# Patient Record
Sex: FEMALE | Race: WHITE | NOT HISPANIC OR LATINO | Employment: OTHER | URBAN - METROPOLITAN AREA
[De-identification: names, ages, dates, MRNs, and addresses within clinical notes are randomized per-mention and may not be internally consistent; named-entity substitution may affect disease eponyms.]

---

## 2020-01-21 ENCOUNTER — APPOINTMENT (OUTPATIENT)
Dept: GENERAL RADIOLOGY | Facility: HOSPITAL | Age: 75
End: 2020-01-21

## 2020-01-21 ENCOUNTER — HOSPITAL ENCOUNTER (EMERGENCY)
Facility: HOSPITAL | Age: 75
Discharge: HOME | End: 2020-01-21
Attending: EMERGENCY MEDICINE | Admitting: EMERGENCY MEDICINE

## 2020-01-21 VITALS
WEIGHT: 121 LBS | OXYGEN SATURATION: 99 % | SYSTOLIC BLOOD PRESSURE: 127 MMHG | RESPIRATION RATE: 16 BRPM | BODY MASS INDEX: 26.1 KG/M2 | HEIGHT: 57 IN | DIASTOLIC BLOOD PRESSURE: 64 MMHG | HEART RATE: 72 BPM | TEMPERATURE: 97.5 F

## 2020-01-21 DIAGNOSIS — S30.0XXA CONTUSION OF LOWER BACK, INITIAL ENCOUNTER: Primary | ICD-10-CM
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
## 2020-01-21 PROCEDURE — 99283 EMERGENCY DEPT VISIT LOW MDM: CPT

## 2020-01-21 PROCEDURE — 72170 X-RAY EXAM OF PELVIS: CPT

## 2020-01-21 RX ORDER — TRAMADOL HYDROCHLORIDE 50 MG/1
50 TABLET ORAL EVERY 8 HOURS PRN
Qty: 12 TABLET | Refills: 0 | Status: ON HOLD | OUTPATIENT
Start: 2020-01-21 | End: 2023-03-26

## 2020-01-21 NOTE — ED PROVIDER NOTES
Subjective   Patient is a 74-year-old female who slipped and fell.  She complains of pain to her right buttocks.  She has no other complaint of injury.  The pain is moderate and worse on motion.  This happened several days ago.          Review of Systems  For head pain neck pain chest pain shortness of breath abdominal pain extremity pain or other complaint.  No past medical history on file.    No Known Allergies    No past surgical history on file.    No family history on file.    Social History     Socioeconomic History   • Marital status:      Spouse name: Not on file   • Number of children: Not on file   • Years of education: Not on file   • Highest education level: Not on file           Objective   Physical Exam  HEENT exam is no point test.  Neck is nontender.  Neurologic exam is nonfocal.  Lungs are clear.  Heart has a regular rhythm.  Chest is nontender.  Abdomen is soft nontender.  Extremity exam shows pain to palpation over her right buttocks.  Procedures           ED Course           Xr Pelvis 1 Or 2 View    Result Date: 1/21/2020  Mild degenerative change of both hips.  No acute bony abnormality identified.  Electronically Signed By-Wade Zavala On:1/21/2020 1:31 PM This report was finalized on [id number] by  Wade Zavala, .                                        MDM  Number of Diagnoses or Management Options  Diagnosis management comments: Patient has findings consistent with right buttocks contusion after falling.  She will be discharged with a prescription for Ultram.  Use a heating pad and follow with MD for recheck but there is no evidence of other injury.    Risk of Complications, Morbidity, and/or Mortality  Presenting problems: moderate  Diagnostic procedures: moderate  Management options: moderate    Patient Progress  Patient progress: stable      Final diagnoses:   Contusion of lower back, initial encounter   Fall, initial encounter            Petey Dos Santos MD  01/21/20 8179

## 2023-03-25 ENCOUNTER — APPOINTMENT (OUTPATIENT)
Dept: GENERAL RADIOLOGY | Facility: HOSPITAL | Age: 78
DRG: 247 | End: 2023-03-25
Payer: MEDICARE

## 2023-03-25 ENCOUNTER — HOSPITAL ENCOUNTER (INPATIENT)
Facility: HOSPITAL | Age: 78
LOS: 7 days | Discharge: HOME | DRG: 247 | End: 2023-04-01
Attending: EMERGENCY MEDICINE | Admitting: INTERNAL MEDICINE
Payer: MEDICARE

## 2023-03-25 ENCOUNTER — APPOINTMENT (OUTPATIENT)
Dept: CARDIOLOGY | Facility: HOSPITAL | Age: 78
DRG: 247 | End: 2023-03-25
Payer: MEDICARE

## 2023-03-25 DIAGNOSIS — I21.3 ST ELEVATION MYOCARDIAL INFARCTION (STEMI), UNSPECIFIED ARTERY: Primary | ICD-10-CM

## 2023-03-25 LAB
ACT BLD: 329 SECONDS (ref 89–137)
ACT BLD: 353 SECONDS (ref 89–137)
ALBUMIN SERPL-MCNC: 3.5 G/DL (ref 3.5–5.2)
ALBUMIN/GLOB SERPL: 1.3 G/DL
ALP SERPL-CCNC: 90 U/L (ref 39–117)
ALT SERPL W P-5'-P-CCNC: 17 U/L (ref 1–33)
ANION GAP SERPL CALCULATED.3IONS-SCNC: 11 MMOL/L (ref 5–15)
APTT PPP: 23.6 SECONDS (ref 61–76.5)
APTT PPP: 56.1 SECONDS (ref 61–76.5)
AST SERPL-CCNC: 53 U/L (ref 1–32)
BASOPHILS # BLD AUTO: 0.1 10*3/MM3 (ref 0–0.2)
BASOPHILS # BLD AUTO: 0.1 10*3/MM3 (ref 0–0.2)
BASOPHILS NFR BLD AUTO: 0.8 % (ref 0–1.5)
BASOPHILS NFR BLD AUTO: 1.1 % (ref 0–1.5)
BH CV XLRA MEAS - DIST GSV CALF DIST LEFT: 0.2 CM
BH CV XLRA MEAS - DIST GSV CALF DIST RIGHT: 0.19 CM
BH CV XLRA MEAS - DIST GSV THIGH DIST LEFT: 0.26 CM
BH CV XLRA MEAS - DIST GSV THIGH DIST RIGHT: 0.19 CM
BH CV XLRA MEAS - MID GSV CALF LEFT: 0.2 CM
BH CV XLRA MEAS - MID GSV CALF RIGHT: 0.15 CM
BH CV XLRA MEAS - MID GSV THIGH  LEFT: 0.3 CM
BH CV XLRA MEAS - MID GSV THIGH  RIGHT: 0.27 CM
BH CV XLRA MEAS - PROX GSV CALF DIST LEFT: 0.17 CM
BH CV XLRA MEAS - PROX GSV CALF DIST RIGHT: 0.15 CM
BH CV XLRA MEAS - PROX GSV THIGH  LEFT: 0.28 CM
BH CV XLRA MEAS - PROX GSV THIGH  RIGHT: 0.2 CM
BH CV XLRA MEAS LEFT DIST CCA EDV: 16.2 CM/SEC
BH CV XLRA MEAS LEFT DIST CCA PSV: 47.8 CM/SEC
BH CV XLRA MEAS LEFT DIST ICA EDV: -13.7 CM/SEC
BH CV XLRA MEAS LEFT DIST ICA PSV: -35.3 CM/SEC
BH CV XLRA MEAS LEFT ICA/CCA RATIO: -1.31
BH CV XLRA MEAS LEFT PROX CCA EDV: 21.1 CM/SEC
BH CV XLRA MEAS LEFT PROX CCA PSV: 66.5 CM/SEC
BH CV XLRA MEAS LEFT PROX ECA PSV: -63.4 CM/SEC
BH CV XLRA MEAS LEFT PROX ICA EDV: -42.3 CM/SEC
BH CV XLRA MEAS LEFT PROX ICA PSV: -87 CM/SEC
BH CV XLRA MEAS LEFT PROX SCLA PSV: 93.3 CM/SEC
BH CV XLRA MEAS LEFT VERTEBRAL A EDV: -29.8 CM/SEC
BH CV XLRA MEAS LEFT VERTEBRAL A PSV: -62.7 CM/SEC
BH CV XLRA MEAS RIGHT DIST CCA EDV: -14.3 CM/SEC
BH CV XLRA MEAS RIGHT DIST CCA PSV: -45.4 CM/SEC
BH CV XLRA MEAS RIGHT DIST ICA EDV: -22.6 CM/SEC
BH CV XLRA MEAS RIGHT DIST ICA PSV: -48.7 CM/SEC
BH CV XLRA MEAS RIGHT ICA/CCA RATIO: -0.97
BH CV XLRA MEAS RIGHT PROX CCA EDV: 16.2 CM/SEC
BH CV XLRA MEAS RIGHT PROX CCA PSV: 50.3 CM/SEC
BH CV XLRA MEAS RIGHT PROX ECA PSV: -122 CM/SEC
BH CV XLRA MEAS RIGHT PROX ICA EDV: -30.2 CM/SEC
BH CV XLRA MEAS RIGHT PROX ICA PSV: -47.7 CM/SEC
BH CV XLRA MEAS RIGHT PROX SCLA PSV: 70.1 CM/SEC
BH CV XLRA MEAS RIGHT VERTEBRAL A EDV: -15.4 CM/SEC
BH CV XLRA MEAS RIGHT VERTEBRAL A PSV: -52.7 CM/SEC
BILIRUB SERPL-MCNC: 0.3 MG/DL (ref 0–1.2)
BUN SERPL-MCNC: 18 MG/DL (ref 8–23)
BUN/CREAT SERPL: 23.7 (ref 7–25)
CALCIUM SPEC-SCNC: 9.5 MG/DL (ref 8.6–10.5)
CHLORIDE SERPL-SCNC: 101 MMOL/L (ref 98–107)
CLUMPED PLATELETS: PRESENT
CLUMPED PLATELETS: PRESENT
CO2 SERPL-SCNC: 27 MMOL/L (ref 22–29)
CREAT SERPL-MCNC: 0.76 MG/DL (ref 0.57–1)
DEPRECATED RDW RBC AUTO: 43.3 FL (ref 37–54)
DEPRECATED RDW RBC AUTO: 44.6 FL (ref 37–54)
EGFRCR SERPLBLD CKD-EPI 2021: 81.8 ML/MIN/1.73
EOSINOPHIL # BLD AUTO: 0.1 10*3/MM3 (ref 0–0.4)
EOSINOPHIL # BLD AUTO: 0.3 10*3/MM3 (ref 0–0.4)
EOSINOPHIL NFR BLD AUTO: 1 % (ref 0.3–6.2)
EOSINOPHIL NFR BLD AUTO: 2.4 % (ref 0.3–6.2)
ERYTHROCYTE [DISTWIDTH] IN BLOOD BY AUTOMATED COUNT: 13.7 % (ref 12.3–15.4)
ERYTHROCYTE [DISTWIDTH] IN BLOOD BY AUTOMATED COUNT: 13.8 % (ref 12.3–15.4)
GLOBULIN UR ELPH-MCNC: 2.6 GM/DL
GLUCOSE BLDC GLUCOMTR-MCNC: 129 MG/DL (ref 70–105)
GLUCOSE BLDC GLUCOMTR-MCNC: 96 MG/DL (ref 70–105)
GLUCOSE SERPL-MCNC: 144 MG/DL (ref 65–99)
HBA1C MFR BLD: 5.7 % (ref 4.8–5.6)
HCT VFR BLD AUTO: 38.3 % (ref 34–46.6)
HCT VFR BLD AUTO: 41.2 % (ref 34–46.6)
HGB BLD-MCNC: 12.9 G/DL (ref 12–15.9)
HGB BLD-MCNC: 13.2 G/DL (ref 12–15.9)
INR PPP: 0.98 (ref 0.93–1.1)
LYMPHOCYTES # BLD AUTO: 1.9 10*3/MM3 (ref 0.7–3.1)
LYMPHOCYTES # BLD AUTO: 4.1 10*3/MM3 (ref 0.7–3.1)
LYMPHOCYTES NFR BLD AUTO: 17.6 % (ref 19.6–45.3)
LYMPHOCYTES NFR BLD AUTO: 37.3 % (ref 19.6–45.3)
MAGNESIUM SERPL-MCNC: 1.8 MG/DL (ref 1.6–2.4)
MAGNESIUM SERPL-MCNC: 1.8 MG/DL (ref 1.6–2.4)
MAXIMAL PREDICTED HEART RATE: 145 BPM
MAXIMAL PREDICTED HEART RATE: 145 BPM
MCH RBC QN AUTO: 29.2 PG (ref 26.6–33)
MCH RBC QN AUTO: 30.2 PG (ref 26.6–33)
MCHC RBC AUTO-ENTMCNC: 31.9 G/DL (ref 31.5–35.7)
MCHC RBC AUTO-ENTMCNC: 33.7 G/DL (ref 31.5–35.7)
MCV RBC AUTO: 89.5 FL (ref 79–97)
MCV RBC AUTO: 91.4 FL (ref 79–97)
MONOCYTES # BLD AUTO: 0.7 10*3/MM3 (ref 0.1–0.9)
MONOCYTES # BLD AUTO: 0.8 10*3/MM3 (ref 0.1–0.9)
MONOCYTES NFR BLD AUTO: 6.4 % (ref 5–12)
MONOCYTES NFR BLD AUTO: 7.4 % (ref 5–12)
NEUTROPHILS NFR BLD AUTO: 5.8 10*3/MM3 (ref 1.7–7)
NEUTROPHILS NFR BLD AUTO: 51.8 % (ref 42.7–76)
NEUTROPHILS NFR BLD AUTO: 7.9 10*3/MM3 (ref 1.7–7)
NEUTROPHILS NFR BLD AUTO: 74.2 % (ref 42.7–76)
NRBC BLD AUTO-RTO: 0.1 /100 WBC (ref 0–0.2)
NRBC BLD AUTO-RTO: 0.5 /100 WBC (ref 0–0.2)
PATHOLOGY REVIEW: YES
PLATELET # BLD AUTO: 184 10*3/MM3 (ref 140–450)
PLATELET # BLD AUTO: 191 10*3/MM3 (ref 140–450)
PMV BLD AUTO: 8.1 FL (ref 6–12)
PMV BLD AUTO: 8.5 FL (ref 6–12)
POTASSIUM SERPL-SCNC: 3.7 MMOL/L (ref 3.5–5.2)
PROT SERPL-MCNC: 6.1 G/DL (ref 6–8.5)
PROTHROMBIN TIME: 10.1 SECONDS (ref 9.6–11.7)
QT INTERVAL: 379 MS
RBC # BLD AUTO: 4.28 10*6/MM3 (ref 3.77–5.28)
RBC # BLD AUTO: 4.51 10*6/MM3 (ref 3.77–5.28)
RBC MORPH BLD: NORMAL
RBC MORPH BLD: NORMAL
SMALL PLATELETS BLD QL SMEAR: ADEQUATE
SODIUM SERPL-SCNC: 139 MMOL/L (ref 136–145)
STRESS TARGET HR: 123 BPM
STRESS TARGET HR: 123 BPM
TROPONIN T SERPL HS-MCNC: 291 NG/L
WBC MORPH BLD: NORMAL
WBC MORPH BLD: NORMAL
WBC NRBC COR # BLD: 10.6 10*3/MM3 (ref 3.4–10.8)
WBC NRBC COR # BLD: 11.1 10*3/MM3 (ref 3.4–10.8)
WHOLE BLOOD HOLD SPECIMEN: NORMAL

## 2023-03-25 PROCEDURE — 85730 THROMBOPLASTIN TIME PARTIAL: CPT | Performed by: EMERGENCY MEDICINE

## 2023-03-25 PROCEDURE — 85007 BL SMEAR W/DIFF WBC COUNT: CPT | Performed by: EMERGENCY MEDICINE

## 2023-03-25 PROCEDURE — 92941 PRQ TRLML REVSC TOT OCCL AMI: CPT | Performed by: INTERNAL MEDICINE

## 2023-03-25 PROCEDURE — 99222 1ST HOSP IP/OBS MODERATE 55: CPT | Performed by: THORACIC SURGERY (CARDIOTHORACIC VASCULAR SURGERY)

## 2023-03-25 PROCEDURE — 84484 ASSAY OF TROPONIN QUANT: CPT | Performed by: EMERGENCY MEDICINE

## 2023-03-25 PROCEDURE — 93880 EXTRACRANIAL BILAT STUDY: CPT

## 2023-03-25 PROCEDURE — C1874 STENT, COATED/COV W/DEL SYS: HCPCS | Performed by: INTERNAL MEDICINE

## 2023-03-25 PROCEDURE — 71045 X-RAY EXAM CHEST 1 VIEW: CPT

## 2023-03-25 PROCEDURE — 99223 1ST HOSP IP/OBS HIGH 75: CPT | Performed by: INTERNAL MEDICINE

## 2023-03-25 PROCEDURE — 3E0333Z INTRODUCTION OF ANTI-INFLAMMATORY INTO PERIPHERAL VEIN, PERCUTANEOUS APPROACH: ICD-10-PCS | Performed by: INTERNAL MEDICINE

## 2023-03-25 PROCEDURE — 82962 GLUCOSE BLOOD TEST: CPT

## 2023-03-25 PROCEDURE — C1887 CATHETER, GUIDING: HCPCS | Performed by: INTERNAL MEDICINE

## 2023-03-25 PROCEDURE — 25010000002 ONDANSETRON PER 1 MG: Performed by: INTERNAL MEDICINE

## 2023-03-25 PROCEDURE — C1725 CATH, TRANSLUMIN NON-LASER: HCPCS | Performed by: INTERNAL MEDICINE

## 2023-03-25 PROCEDURE — 93970 EXTREMITY STUDY: CPT

## 2023-03-25 PROCEDURE — B2111ZZ FLUOROSCOPY OF MULTIPLE CORONARY ARTERIES USING LOW OSMOLAR CONTRAST: ICD-10-PCS | Performed by: INTERNAL MEDICINE

## 2023-03-25 PROCEDURE — C1894 INTRO/SHEATH, NON-LASER: HCPCS | Performed by: INTERNAL MEDICINE

## 2023-03-25 PROCEDURE — 25010000002 HEPARIN (PORCINE) 25000-0.45 UT/250ML-% SOLUTION: Performed by: INTERNAL MEDICINE

## 2023-03-25 PROCEDURE — 83735 ASSAY OF MAGNESIUM: CPT | Performed by: INTERNAL MEDICINE

## 2023-03-25 PROCEDURE — 36415 COLL VENOUS BLD VENIPUNCTURE: CPT

## 2023-03-25 PROCEDURE — 25510000001 IOPAMIDOL PER 1 ML: Performed by: INTERNAL MEDICINE

## 2023-03-25 PROCEDURE — 85347 COAGULATION TIME ACTIVATED: CPT

## 2023-03-25 PROCEDURE — 25010000002 FENTANYL CITRATE (PF) 50 MCG/ML SOLUTION: Performed by: EMERGENCY MEDICINE

## 2023-03-25 PROCEDURE — 99285 EMERGENCY DEPT VISIT HI MDM: CPT

## 2023-03-25 PROCEDURE — 93005 ELECTROCARDIOGRAM TRACING: CPT

## 2023-03-25 PROCEDURE — 25010000002 MIDAZOLAM PER 1 MG: Performed by: INTERNAL MEDICINE

## 2023-03-25 PROCEDURE — 80053 COMPREHEN METABOLIC PANEL: CPT | Performed by: EMERGENCY MEDICINE

## 2023-03-25 PROCEDURE — 25010000002 HEPARIN (PORCINE) PER 1000 UNITS: Performed by: EMERGENCY MEDICINE

## 2023-03-25 PROCEDURE — 93458 L HRT ARTERY/VENTRICLE ANGIO: CPT | Performed by: INTERNAL MEDICINE

## 2023-03-25 PROCEDURE — 85007 BL SMEAR W/DIFF WBC COUNT: CPT | Performed by: INTERNAL MEDICINE

## 2023-03-25 PROCEDURE — 93010 ELECTROCARDIOGRAM REPORT: CPT | Performed by: INTERNAL MEDICINE

## 2023-03-25 PROCEDURE — 25010000002 ONDANSETRON PER 1 MG: Performed by: EMERGENCY MEDICINE

## 2023-03-25 PROCEDURE — 99152 MOD SED SAME PHYS/QHP 5/>YRS: CPT | Performed by: INTERNAL MEDICINE

## 2023-03-25 PROCEDURE — 85025 COMPLETE CBC W/AUTO DIFF WBC: CPT | Performed by: INTERNAL MEDICINE

## 2023-03-25 PROCEDURE — 027035Z DILATION OF CORONARY ARTERY, ONE ARTERY WITH TWO DRUG-ELUTING INTRALUMINAL DEVICES, PERCUTANEOUS APPROACH: ICD-10-PCS | Performed by: INTERNAL MEDICINE

## 2023-03-25 PROCEDURE — B2151ZZ FLUOROSCOPY OF LEFT HEART USING LOW OSMOLAR CONTRAST: ICD-10-PCS | Performed by: INTERNAL MEDICINE

## 2023-03-25 PROCEDURE — C1769 GUIDE WIRE: HCPCS | Performed by: INTERNAL MEDICINE

## 2023-03-25 PROCEDURE — 99153 MOD SED SAME PHYS/QHP EA: CPT | Performed by: INTERNAL MEDICINE

## 2023-03-25 PROCEDURE — 85730 THROMBOPLASTIN TIME PARTIAL: CPT | Performed by: INTERNAL MEDICINE

## 2023-03-25 PROCEDURE — 93005 ELECTROCARDIOGRAM TRACING: CPT | Performed by: EMERGENCY MEDICINE

## 2023-03-25 PROCEDURE — 25010000002 HEPARIN (PORCINE) PER 1000 UNITS: Performed by: INTERNAL MEDICINE

## 2023-03-25 PROCEDURE — 83036 HEMOGLOBIN GLYCOSYLATED A1C: CPT | Performed by: INTERNAL MEDICINE

## 2023-03-25 PROCEDURE — 85610 PROTHROMBIN TIME: CPT | Performed by: EMERGENCY MEDICINE

## 2023-03-25 PROCEDURE — 85025 COMPLETE CBC W/AUTO DIFF WBC: CPT | Performed by: EMERGENCY MEDICINE

## 2023-03-25 PROCEDURE — C9606 PERC D-E COR REVASC W AMI S: HCPCS | Performed by: INTERNAL MEDICINE

## 2023-03-25 PROCEDURE — 93454 CORONARY ARTERY ANGIO S&I: CPT | Performed by: INTERNAL MEDICINE

## 2023-03-25 PROCEDURE — 25010000002 MORPHINE PER 10 MG: Performed by: INTERNAL MEDICINE

## 2023-03-25 PROCEDURE — 25010000002 FENTANYL CITRATE (PF) 100 MCG/2ML SOLUTION: Performed by: INTERNAL MEDICINE

## 2023-03-25 PROCEDURE — 25010000002 EPTIFIBATIDE PER 5 MG: Performed by: EMERGENCY MEDICINE

## 2023-03-25 PROCEDURE — 4A023N7 MEASUREMENT OF CARDIAC SAMPLING AND PRESSURE, LEFT HEART, PERCUTANEOUS APPROACH: ICD-10-PCS | Performed by: INTERNAL MEDICINE

## 2023-03-25 DEVICE — XIENCE SKYPOINT™ EVEROLIMUS ELUTING CORONARY STENT SYSTEM 2.25 MM X 38 MM / RAPID-EXCHANGE
Type: IMPLANTABLE DEVICE | Site: CORONARY | Status: FUNCTIONAL
Brand: XIENCE SKYPOINT™

## 2023-03-25 DEVICE — XIENCE SKYPOINT™ EVEROLIMUS ELUTING CORONARY STENT SYSTEM 3.00 MM X 28 MM / RAPID-EXCHANGE
Type: IMPLANTABLE DEVICE | Site: CORONARY | Status: FUNCTIONAL
Brand: XIENCE SKYPOINT™

## 2023-03-25 RX ORDER — FENTANYL CITRATE 50 UG/ML
INJECTION, SOLUTION INTRAMUSCULAR; INTRAVENOUS
Status: COMPLETED | OUTPATIENT
Start: 2023-03-25 | End: 2023-03-25

## 2023-03-25 RX ORDER — HEPARIN SODIUM 1000 [USP'U]/ML
INJECTION, SOLUTION INTRAVENOUS; SUBCUTANEOUS
Status: DISCONTINUED | OUTPATIENT
Start: 2023-03-25 | End: 2023-03-25 | Stop reason: HOSPADM

## 2023-03-25 RX ORDER — LIDOCAINE HYDROCHLORIDE 20 MG/ML
INJECTION, SOLUTION INFILTRATION; PERINEURAL
Status: DISCONTINUED | OUTPATIENT
Start: 2023-03-25 | End: 2023-03-25 | Stop reason: HOSPADM

## 2023-03-25 RX ORDER — HEPARIN SODIUM 10000 [USP'U]/100ML
12 INJECTION, SOLUTION INTRAVENOUS
Status: DISCONTINUED | OUTPATIENT
Start: 2023-03-25 | End: 2023-03-29

## 2023-03-25 RX ORDER — MORPHINE SULFATE 2 MG/ML
2 INJECTION, SOLUTION INTRAMUSCULAR; INTRAVENOUS
Status: DISPENSED | OUTPATIENT
Start: 2023-03-25 | End: 2023-03-27

## 2023-03-25 RX ORDER — NITROGLYCERIN 20 MG/100ML
5-200 INJECTION INTRAVENOUS
Status: DISCONTINUED | OUTPATIENT
Start: 2023-03-25 | End: 2023-03-27

## 2023-03-25 RX ORDER — SODIUM CHLORIDE 9 MG/ML
3 INJECTION, SOLUTION INTRAVENOUS CONTINUOUS
Status: DISPENSED | OUTPATIENT
Start: 2023-03-25 | End: 2023-03-25

## 2023-03-25 RX ORDER — MIDAZOLAM HYDROCHLORIDE 1 MG/ML
INJECTION INTRAMUSCULAR; INTRAVENOUS
Status: DISCONTINUED | OUTPATIENT
Start: 2023-03-25 | End: 2023-03-25 | Stop reason: HOSPADM

## 2023-03-25 RX ORDER — POTASSIUM CHLORIDE 20 MEQ/1
40 TABLET, EXTENDED RELEASE ORAL AS NEEDED
Status: DISCONTINUED | OUTPATIENT
Start: 2023-03-25 | End: 2023-04-01 | Stop reason: HOSPADM

## 2023-03-25 RX ORDER — NITROGLYCERIN 20 MG/100ML
5-200 INJECTION INTRAVENOUS
Status: DISCONTINUED | OUTPATIENT
Start: 2023-03-25 | End: 2023-03-25 | Stop reason: SDUPTHER

## 2023-03-25 RX ORDER — FENTANYL CITRATE 50 UG/ML
INJECTION, SOLUTION INTRAMUSCULAR; INTRAVENOUS
Status: DISPENSED
Start: 2023-03-25 | End: 2023-03-25

## 2023-03-25 RX ORDER — HEPARIN SODIUM 10000 [USP'U]/100ML
12 INJECTION, SOLUTION INTRAVENOUS
Status: DISCONTINUED | OUTPATIENT
Start: 2023-03-25 | End: 2023-03-25

## 2023-03-25 RX ORDER — ACETAMINOPHEN 325 MG/1
650 TABLET ORAL EVERY 4 HOURS PRN
Status: DISCONTINUED | OUTPATIENT
Start: 2023-03-25 | End: 2023-04-01 | Stop reason: HOSPADM

## 2023-03-25 RX ORDER — ONDANSETRON 2 MG/ML
INJECTION INTRAMUSCULAR; INTRAVENOUS
Status: COMPLETED | OUTPATIENT
Start: 2023-03-25 | End: 2023-03-25

## 2023-03-25 RX ORDER — FENTANYL CITRATE 50 UG/ML
INJECTION, SOLUTION INTRAMUSCULAR; INTRAVENOUS
Status: DISCONTINUED | OUTPATIENT
Start: 2023-03-25 | End: 2023-03-25 | Stop reason: HOSPADM

## 2023-03-25 RX ORDER — NITROGLYCERIN 20 MG/100ML
INJECTION INTRAVENOUS
Status: DISCONTINUED
Start: 2023-03-25 | End: 2023-03-25 | Stop reason: WASHOUT

## 2023-03-25 RX ORDER — SODIUM CHLORIDE 9 MG/ML
INJECTION, SOLUTION INTRAVENOUS
Status: COMPLETED | OUTPATIENT
Start: 2023-03-25 | End: 2023-03-25

## 2023-03-25 RX ORDER — EPTIFIBATIDE 0.75 MG/ML
INJECTION, SOLUTION INTRAVENOUS
Status: DISPENSED
Start: 2023-03-25 | End: 2023-03-25

## 2023-03-25 RX ORDER — ASPIRIN 81 MG/1
81 TABLET, CHEWABLE ORAL DAILY
Status: DISCONTINUED | OUTPATIENT
Start: 2023-03-25 | End: 2023-04-01 | Stop reason: HOSPADM

## 2023-03-25 RX ORDER — ONDANSETRON 2 MG/ML
INJECTION INTRAMUSCULAR; INTRAVENOUS
Status: DISPENSED
Start: 2023-03-25 | End: 2023-03-25

## 2023-03-25 RX ORDER — EPTIFIBATIDE 0.75 MG/ML
INJECTION, SOLUTION INTRAVENOUS
Status: COMPLETED | OUTPATIENT
Start: 2023-03-25 | End: 2023-03-25

## 2023-03-25 RX ORDER — ATORVASTATIN CALCIUM 40 MG/1
80 TABLET, FILM COATED ORAL NIGHTLY
Status: DISCONTINUED | OUTPATIENT
Start: 2023-03-25 | End: 2023-04-01 | Stop reason: HOSPADM

## 2023-03-25 RX ORDER — NITROGLYCERIN 5 MG/ML
INJECTION, SOLUTION INTRAVENOUS
Status: DISCONTINUED | OUTPATIENT
Start: 2023-03-25 | End: 2023-03-25 | Stop reason: HOSPADM

## 2023-03-25 RX ORDER — HEPARIN SODIUM 1000 [USP'U]/ML
INJECTION, SOLUTION INTRAVENOUS; SUBCUTANEOUS
Status: DISPENSED
Start: 2023-03-25 | End: 2023-03-25

## 2023-03-25 RX ORDER — ONDANSETRON 2 MG/ML
4 INJECTION INTRAMUSCULAR; INTRAVENOUS EVERY 6 HOURS PRN
Status: DISCONTINUED | OUTPATIENT
Start: 2023-03-25 | End: 2023-04-01 | Stop reason: HOSPADM

## 2023-03-25 RX ORDER — POTASSIUM CHLORIDE 1.5 G/1.77G
40 POWDER, FOR SOLUTION ORAL AS NEEDED
Status: DISCONTINUED | OUTPATIENT
Start: 2023-03-25 | End: 2023-04-01 | Stop reason: HOSPADM

## 2023-03-25 RX ORDER — MAGNESIUM SULFATE HEPTAHYDRATE 40 MG/ML
2 INJECTION, SOLUTION INTRAVENOUS AS NEEDED
Status: DISCONTINUED | OUTPATIENT
Start: 2023-03-25 | End: 2023-04-01 | Stop reason: HOSPADM

## 2023-03-25 RX ORDER — ONDANSETRON 2 MG/ML
INJECTION INTRAMUSCULAR; INTRAVENOUS
Status: DISCONTINUED | OUTPATIENT
Start: 2023-03-25 | End: 2023-03-25 | Stop reason: HOSPADM

## 2023-03-25 RX ORDER — MAGNESIUM SULFATE HEPTAHYDRATE 40 MG/ML
4 INJECTION, SOLUTION INTRAVENOUS AS NEEDED
Status: DISCONTINUED | OUTPATIENT
Start: 2023-03-25 | End: 2023-04-01 | Stop reason: HOSPADM

## 2023-03-25 RX ORDER — HEPARIN SODIUM 5000 [USP'U]/ML
INJECTION, SOLUTION INTRAVENOUS; SUBCUTANEOUS
Status: COMPLETED | OUTPATIENT
Start: 2023-03-25 | End: 2023-03-25

## 2023-03-25 RX ADMIN — HEPARIN SODIUM 3744 UNITS: 5000 INJECTION INTRAVENOUS; SUBCUTANEOUS at 01:53

## 2023-03-25 RX ADMIN — NITROGLYCERIN 5 MCG/MIN: 20 INJECTION INTRAVENOUS at 05:45

## 2023-03-25 RX ADMIN — MORPHINE SULFATE 2 MG: 2 INJECTION, SOLUTION INTRAMUSCULAR; INTRAVENOUS at 11:14

## 2023-03-25 RX ADMIN — METOPROLOL TARTRATE 25 MG: 25 TABLET, FILM COATED ORAL at 20:01

## 2023-03-25 RX ADMIN — ATORVASTATIN CALCIUM 80 MG: 40 TABLET, FILM COATED ORAL at 20:00

## 2023-03-25 RX ADMIN — HEPARIN SODIUM 12 UNITS/KG/HR: 10000 INJECTION, SOLUTION INTRAVENOUS at 09:47

## 2023-03-25 RX ADMIN — ASPIRIN 81 MG CHEWABLE TABLET 81 MG: 81 TABLET CHEWABLE at 09:55

## 2023-03-25 RX ADMIN — ONDANSETRON 4 MG: 2 INJECTION INTRAMUSCULAR; INTRAVENOUS at 20:00

## 2023-03-25 RX ADMIN — TICAGRELOR 90 MG: 90 TABLET ORAL at 18:23

## 2023-03-25 RX ADMIN — ONDANSETRON 4 MG: 2 INJECTION INTRAMUSCULAR; INTRAVENOUS at 01:56

## 2023-03-25 RX ADMIN — FENTANYL CITRATE 25 MCG: 50 INJECTION, SOLUTION INTRAMUSCULAR; INTRAVENOUS at 01:57

## 2023-03-25 RX ADMIN — EPTIFIBATIDE 2 MCG/KG/MIN: 0.75 INJECTION INTRAVENOUS at 01:58

## 2023-03-25 RX ADMIN — FENTANYL CITRATE 25 MCG: 50 INJECTION, SOLUTION INTRAMUSCULAR; INTRAVENOUS at 02:15

## 2023-03-25 RX ADMIN — NITROGLYCERIN 10 MCG/MIN: 20 INJECTION INTRAVENOUS at 09:59

## 2023-03-25 RX ADMIN — METOPROLOL TARTRATE 25 MG: 25 TABLET, FILM COATED ORAL at 09:55

## 2023-03-25 NOTE — Clinical Note
A 6 fr sheath was  inserted using micropuncture technique into the right femoral artery.
A 6 fr sheath was  inserted using micropuncture technique into the right femoral artery.
ACT = 329 (sec). ACT was drawn at 04:10 EDT. ACT result was completed at 04:18 EDT.
ACT = 353 (sec). ACT was drawn at 03:36 EDT. ACT result was completed at 03:43 EDT.
All Patches/Pads removed. Skin Intact.
All Patches/Pads removed. Skin Intact.
All interventional equipment removed.
Allergies reviewed.  H&P note has been confirmed for the patient. Procedural consent has been signed.  Staff has reviewed the patient's labs.
Allergies reviewed.  H&P note has been confirmed for the patient. Procedural consent has been signed.  Staff has reviewed the patient's labs.  Labs have been reviewed and show abnormalities. Physician is aware of labs.
Balloon inserted in right coronary artery.
Catheter Pulled back from LV to AO. Measurement captured.
Catheter Pulled back from LV to AO. Measurement captured.
Catheter inserted with wire simultaneously.
Catheter inserted with wire simultaneously.
Circumflex lesion.
Dr. Carrasco is speaking with Dr. Cardenas on the phone
First balloon inflation max pressure = 12 jossie. First balloon inflation duration = 10 seconds. Second inflation of balloon - Max pressure = 12 jossie. 2nd Inflation of balloon - Duration = 15 seconds. Third inflation of balloon - Max pressure = 12 jossie. 3rd Inflation of balloon - Duration = 10 seconds. Fourth inflation of balloon - Max pressure = 12 jossie. 4th Inflation of balloon - Duration = 15 seconds.
First balloon inflation max pressure = 12 jsosie. First balloon inflation duration = 8 seconds. Second inflation of balloon - Max pressure = 12 jossie. 2nd Inflation of balloon - Duration = 8 seconds. Third inflation of balloon - Max pressure = 12 jossie. 3rd Inflation of balloon - Duration = 12 seconds. Fourth inflation of balloon - Max pressure = 14 jossie. 4th Inflation of balloon - Duration = 8 seconds.
First balloon inflation max pressure = 18 jossie. First balloon inflation duration = 18 seconds.
First balloon inflation max pressure = 18 jossie. First balloon inflation duration = 25 seconds. Second inflation of balloon - Max pressure = 16 jossie. 2nd Inflation of balloon - Duration = 6 seconds. Third inflation of balloon - Max pressure = 16 jossie. 3rd Inflation of balloon - Duration = 6 seconds. Fourth inflation of balloon - Max pressure = 18 jossie. 4th Inflation of balloon - Duration = 8 seconds.
First balloon inflation max pressure = 18 jossie. First balloon inflation duration = 8 seconds. Second inflation of balloon - Max pressure = 18 jossie. 2nd Inflation of balloon - Duration = 12 seconds. Third inflation of balloon - Max pressure = 18 jossie. 3rd Inflation of balloon - Duration = 10 seconds. Fourth inflation of balloon - Max pressure = 16 jossie. 4th Inflation of balloon - Duration = 10 seconds.
First balloon inflation max pressure = 18 jossie. First balloon inflation duration = 8 seconds. Second inflation of balloon - Max pressure = 18 jossie. 2nd Inflation of balloon - Duration = 8 seconds. Third inflation of balloon - Max pressure = 18 jossie. 3rd Inflation of balloon - Duration = 8 seconds. Fourth inflation of balloon - Max pressure = 18 jossie. 4th Inflation of balloon - Duration = 8 seconds.
First balloon inflation max pressure = 8 jossie. First balloon inflation duration = 18 seconds. Second inflation of balloon - Max pressure = 8 jossie. 2nd Inflation of balloon - Duration = 18 seconds.
First balloon inflation max pressure = 9 jossie. First balloon inflation duration = 10 seconds. Second inflation of balloon - Max pressure = 9 jossie. 2nd Inflation of balloon - Duration = 10 seconds. Third inflation of balloon - Max pressure = 12 jossie. 3rd Inflation of balloon - Duration = 10 seconds. Fourth inflation of balloon - Max pressure = 12 jossie. 4th Inflation of balloon - Duration = 10 seconds.
Groin image taken for closer device 
Hemostasis started on the right femoral artery. Angio-Seal was used in achieving hemostasis. Closure device deployed in the vessel. Hemostasis achieved successfully.
No in lab complications
On the phone with Dr. Cardenas again
Patient was given Post Procedure instruction by the staff.
Physician notified by staff.
Physician notified by staff.
Prepped: groin. Prepped with: ChloraPrep. The site was clipped. The patient was draped in a sterile fashion.
Prepped: groin. Prepped with: ChloraPrep. The site was clipped. The patient was draped in a sterile fashion.
Pt came to lab C with large bruising to Right groin area. Right hip to mid pubis.
Removed intact
Removed intact
Report was  verbally given at bedside. .
Report was  verbally given at bedside. .
Right coronary artery stent inserted.
Right coronary artery stent inserted.
Stent balloon removed intact.
Stent balloon removed intact.
The DP pulses are +1 bilaterally. The PT pulses are +1 bilaterally.
The DP pulses are +2 bilaterally. The PT pulses are +1 bilaterally.
The left coronary artery was selectively engaged, injected and visualized.
The left coronary artery was selectively engaged, injected and visualized.
The left ventricle was injected and visualized. Hand injected
The left ventricle was injected and visualized. hand
The physician has confirmed that the patient has been reassessed and is appropriate for moderate sedation
The physician has confirmed that the patient has been reassessed and is appropriate for moderate sedation
The right coronary artery was selectively engaged, injected and visualized.
Transparent Dressing was used to secure the sheath post procedure.  Sheath Left Intact after the procedure.  Pressure Bag was used to stabalize the sheath post procedure.
Wire inserted in circumflex.
Wire inserted in right coronary artery.
catheter advanced into LV.
catheter advanced into LV.
catheter inserted over wire.
catheter removed  over the wire.
catheter removed.
catheter removed.
guide catheter removed .
guidewire removed.
inserted over wire.
inserted over wire.
removed.
removed.
(0) Performs both tasks correctly

## 2023-03-25 NOTE — CONSULTS
Patient Care Team:  Fermin Payton MD as PCP - General (Family Medicine)  Referring Provider:  Dr. Carrasco  Reason for consultation:  MV CAD/ STEMI    Chief complaint:  Chest pain    Subjective     History of Present Illness:  78 y/o woman presented to [facility] ED with complaints of substernal chest pain that radiated in 2 her neck, jaws and her back.  This has been going on intermittently for approximately 1 week.  Associated symptoms included nausea, diaphoresis, and shortness of air.  She was found to have a STEMI and was taken emergently to the Cath Lab.  Dr. Carrasco found culprit vessel to be an occluded RCA and formed an acute intervention with overlapping stents and proximal LCx disease.  She was given aspirin, 180 mg of Brilinta, and started on Integrilin.  The Integrilin was discontinued after her platelet count was noted to be 12,000.  It has since improved to 191 K.  PMHx includes: Tobacco abuse and family history of CAD.  Dr. Cardenas was consulted for surgical revascularization.    Review of Systems   Constitutional: Positive for diaphoresis.   Respiratory: Positive for shortness of breath.    Cardiovascular: Positive for chest pain.   Gastrointestinal: Positive for nausea.   Neurological: Negative for dizziness, weakness and light-headedness.        History reviewed. No pertinent past medical history.  History reviewed. No pertinent surgical history.  Family History   Problem Relation Age of Onset    No Known Problems Mother     No Known Problems Father      Social History     Tobacco Use    Smoking status: Former     Types: Cigarettes     Quit date: 3/13/2020     Years since quitting: 3.0    Smokeless tobacco: Never   Vaping Use    Vaping Use: Never used   Substance Use Topics    Alcohol use: Yes     Comment: occasional    Drug use: Never     Medications Prior to Admission   Medication Sig Dispense Refill Last Dose    traMADol (ULTRAM) 50 MG tablet Take 1 tablet by mouth Every 8 (Eight) Hours As Needed for  "Moderate Pain . 12 tablet 0      aspirin, 81 mg, Oral, Daily  atorvastatin, 80 mg, Oral, Nightly  heparin (porcine), , ,   metoprolol tartrate, 25 mg, Oral, Q12H      Allergies:  Patient has no known allergies.    Objective      Vital Signs  Temp:  [97.3 °F (36.3 °C)-97.4 °F (36.3 °C)] 97.4 °F (36.3 °C)  Heart Rate:  [62-97] 65  Resp:  [12-25] 12  BP: ()/(52-98) 110/69    Flowsheet Rows      Flowsheet Row First Filed Value   Admission Height 144.8 cm (57\") Documented at 03/25/2023 0132   Admission Weight 62.4 kg (137 lb 9.6 oz) Documented at 03/25/2023 0140          144.8 cm (57\")    Physical Exam  Vitals and nursing note reviewed.   Constitutional:       General: She is awake.      Appearance: Normal appearance. She is well-developed and well-groomed.   HENT:      Head: Normocephalic and atraumatic.      Nose: Nose normal.      Mouth/Throat:      Lips: Pink.      Mouth: Mucous membranes are moist.      Pharynx: Uvula midline.   Eyes:      General: Lids are normal. No scleral icterus.     Extraocular Movements: Extraocular movements intact.      Conjunctiva/sclera: Conjunctivae normal.      Pupils: Pupils are equal, round, and reactive to light.   Neck:      Thyroid: No thyroid mass or thyromegaly.      Vascular: Normal carotid pulses. No carotid bruit, hepatojugular reflux or JVD.      Trachea: Trachea normal.   Cardiovascular:      Rate and Rhythm: Normal rate and regular rhythm.      Pulses:           Carotid pulses are 2+ on the right side and 2+ on the left side.       Radial pulses are 2+ on the right side and 2+ on the left side.        Femoral pulses are 2+ on the right side and 2+ on the left side.       Popliteal pulses are 2+ on the right side and 2+ on the left side.        Dorsalis pedis pulses are 2+ on the right side and 2+ on the left side.        Posterior tibial pulses are 2+ on the right side and 2+ on the left side.      Heart sounds: Normal heart sounds. No murmur heard.     Comments: " Tele:  SR 60  Drips: Heparin/NTG  Pulmonary:      Effort: Pulmonary effort is normal.      Breath sounds: Normal breath sounds.      Comments: 2L O2  Abdominal:      General: Abdomen is protuberant. Bowel sounds are normal. There is no distension.      Palpations: Abdomen is soft.      Tenderness: There is no abdominal tenderness.   Musculoskeletal:      Cervical back: Neck supple.      Comments: Gait steady and strong without use of assistive devices   Lymphadenopathy:      Cervical: No cervical adenopathy.      Upper Body:      Right upper body: No supraclavicular adenopathy.      Left upper body: No supraclavicular adenopathy.   Skin:     General: Skin is warm and dry.      Capillary Refill: Capillary refill takes less than 2 seconds.      Findings: No erythema or rash.      Nails: There is no clubbing.   Neurological:      Mental Status: She is alert and oriented to person, place, and time.      GCS: GCS eye subscore is 4. GCS verbal subscore is 5. GCS motor subscore is 6.   Psychiatric:         Attention and Perception: Attention and perception normal.         Mood and Affect: Mood and affect normal.         Speech: Speech normal.         Behavior: Behavior normal. Behavior is cooperative.         Thought Content: Thought content normal.         Cognition and Memory: Cognition and memory normal.         Judgment: Judgment normal.         Results Review:   Lab Results (last 24 hours)       Procedure Component Value Units Date/Time    Extra Tubes [[id number]] Collected: 03/25/23 0958    Specimen: Blood, Venous Line Updated: 03/25/23 1101    Narrative:      The following orders were created for panel order Extra Tubes.  Procedure                               Abnormality         Status                     ---------                               -----------         ------                     Lavender Top[[id number]]                                     Final result                 Please view results for these tests  on the individual orders.    Lavender Top [[id number]] Collected: 03/25/23 0958    Specimen: Blood Updated: 03/25/23 1101     Extra Tube hold for add-on     Comment: Auto resulted       Magnesium [[id number]]  (Normal) Collected: 03/25/23 0958    Specimen: Blood Updated: 03/25/23 1055     Magnesium 1.8 mg/dL     CBC & Differential [[id number]]  (Abnormal) Collected: 03/25/23 0212    Specimen: Blood Updated: 03/25/23 0928    Narrative:      The following orders were created for panel order CBC & Differential.  Procedure                               Abnormality         Status                     ---------                               -----------         ------                     CBC Auto Differential[[id number]]        Abnormal            Edited Result - FINAL      Scan Slide[[id number]]                                       Edited Result - FINAL      Path Consult Reflex[[id number]]                              Edited Result - FINAL        Please view results for these tests on the individual orders.    Scan Slide [[id number]] Collected: 03/25/23 0212    Specimen: Blood Updated: 03/25/23 0928     RBC Morphology Normal     WBC Morphology Normal     Platelet Estimate Adequate     Comment: Corrected result. Previous result was Decreased on 3/25/2023 at 0247 EDT.        Clumped Platelets Present     Comment: Appended report. These results have been appended to a previously final verified report.       Path Consult Reflex [[id number]] Collected: 03/25/23 0212    Specimen: Blood Updated: 03/25/23 0928     Pathology Review Yes    CBC Auto Differential [[id number]]  (Abnormal) Collected: 03/25/23 0212    Specimen: Blood Updated: 03/25/23 0928     WBC 11.10 10*3/mm3      RBC 4.51 10*6/mm3      Hemoglobin 13.2 g/dL      Hematocrit 41.2 %      MCV 91.4 fL      MCH 29.2 pg      MCHC 31.9 g/dL      RDW 13.8 %      RDW-SD 43.3 fl      MPV 8.5 fL      Platelets 184 10*3/mm3      Comment: Platelet estimate performed due to platelet  clumping. Spoke with Roosevelt MADERA RN and let him know that previous platelet count was not correct.   Corrected result. Previous result was 12 10*3/mm3 on 3/25/2023 at 0247 EDT.        Neutrophil % 51.8 %      Lymphocyte % 37.3 %      Monocyte % 7.4 %      Eosinophil % 2.4 %      Basophil % 1.1 %      Neutrophils, Absolute 5.80 10*3/mm3      Lymphocytes, Absolute 4.10 10*3/mm3      Monocytes, Absolute 0.80 10*3/mm3      Eosinophils, Absolute 0.30 10*3/mm3      Basophils, Absolute 0.10 10*3/mm3      nRBC 0.1 /100 WBC     Narrative:      Appended report. These results have been appended to a previously verified report.  Modified report. Previous result was Hemogram + Auto diff on 3/25/2023 at 0247 EDT.    CBC & Differential [[id number]]  (Abnormal) Collected: 03/25/23 0605    Specimen: Blood Updated: 03/25/23 0751    Narrative:      The following orders were created for panel order CBC & Differential.  Procedure                               Abnormality         Status                     ---------                               -----------         ------                     CBC Auto Differential[[id number]]        Abnormal            Final result               Scan Slide[[id number]]                                       Final result                 Please view results for these tests on the individual orders.    CBC Auto Differential [[id number]]  (Abnormal) Collected: 03/25/23 0605    Specimen: Blood Updated: 03/25/23 0751     WBC 10.60 10*3/mm3      RBC 4.28 10*6/mm3      Hemoglobin 12.9 g/dL      Hematocrit 38.3 %      MCV 89.5 fL      MCH 30.2 pg      MCHC 33.7 g/dL      RDW 13.7 %      RDW-SD 44.6 fl      MPV 8.1 fL      Platelets 191 10*3/mm3      Comment: Platelet count performed on sodium citrate tube due to EDTA clumping.         Neutrophil % 74.2 %      Lymphocyte % 17.6 %      Monocyte % 6.4 %      Eosinophil % 1.0 %      Basophil % 0.8 %      Neutrophils, Absolute 7.90 10*3/mm3      Lymphocytes, Absolute 1.90  10*3/mm3      Monocytes, Absolute 0.70 10*3/mm3      Eosinophils, Absolute 0.10 10*3/mm3      Basophils, Absolute 0.10 10*3/mm3      nRBC 0.5 /100 WBC     Scan Slide [[id number]] Collected: 03/25/23 0605    Specimen: Blood Updated: 03/25/23 0751     RBC Morphology Normal     WBC Morphology Normal     Clumped Platelets Present    Magnesium [[id number]]  (Normal) Collected: 03/25/23 0154    Specimen: Blood Updated: 03/25/23 0658     Magnesium 1.8 mg/dL     Hemoglobin A1c [[id number]]  (Abnormal) Collected: 03/25/23 0605    Specimen: Blood Updated: 03/25/23 0621     Hemoglobin A1C 5.70 %     POC Glucose Once [[id number]]  (Abnormal) Collected: 03/25/23 0526    Specimen: Blood Updated: 03/25/23 0528     Glucose 129 mg/dL      Comment: Serial Number: [id number]:  [id number]       Single High Sensitivity Troponin T [[id number]]  (Abnormal) Collected: 03/25/23 0154    Specimen: Blood Updated: 03/25/23 0225     HS Troponin T 291 ng/L     Narrative:      High Sensitive Troponin T Reference Range:  <10.0 ng/L- Negative Female for AMI  <15.0 ng/L- Negative Male for AMI  >=10 - Abnormal Female indicating possible myocardial injury.  >=15 - Abnormal Male indicating possible myocardial injury.   Clinicians would have to utilize clinical acumen, EKG, Troponin, and serial changes to determine if it is an Acute Myocardial Infarction or myocardial injury due to an underlying chronic condition.         Comprehensive Metabolic Panel [[id number]]  (Abnormal) Collected: 03/25/23 0154    Specimen: Blood Updated: 03/25/23 0223     Glucose 144 mg/dL      BUN 18 mg/dL      Creatinine 0.76 mg/dL      Sodium 139 mmol/L      Potassium 3.7 mmol/L      Chloride 101 mmol/L      CO2 27.0 mmol/L      Calcium 9.5 mg/dL      Total Protein 6.1 g/dL      Albumin 3.5 g/dL      ALT (SGPT) 17 U/L      AST (SGOT) 53 U/L      Alkaline Phosphatase 90 U/L      Total Bilirubin 0.3 mg/dL      Globulin 2.6 gm/dL      A/G Ratio 1.3 g/dL      BUN/Creatinine Ratio  23.7     Anion Gap 11.0 mmol/L      eGFR 81.8 mL/min/1.73     Narrative:      GFR Normal >60  Chronic Kidney Disease <60  Kidney Failure <15    The GFR formula is only valid for adults with stable renal function between ages 18 and 70.    Protime-INR [[id number]]  (Normal) Collected: 03/25/23 0154    Specimen: Blood Updated: 03/25/23 0214     Protime 10.1 Seconds      INR 0.98    aPTT [[id number]]  (Abnormal) Collected: 03/25/23 0154    Specimen: Blood Updated: 03/25/23 0214     PTT 23.6 seconds                 Assessment & Plan       ST elevation myocardial infarction (STEMI), unspecified artery (HCC)      Assessment & Plan     - MV CAD, rescue stenting to RCA, EF unknown % (echo)--surgical work-up in progress, heparin/Brilinta  - STEMI presentation  - Acute TCP, likely felt r/t Integrilin--191K today  - Tobacco abuse--cessation discussed with patient  - Family history CAD    Dr. Cardenas reviewed films and discussed with Dr. Carrasco last evening.  Patient remains on heparin and NTG drips.  She was also loaded with Brilinta.  Integrilin was discontinued after acute TCP was noted.  Echo pending.  Full recommendations to follow.    Thank you for allowing us to participate in the care of this patient.      Ngozi Mcdonough, PRECIOUS  03/25/23  11:50 EDT    **all problems new to this examiner  **EKG and CXR independently reviewed and interpreted  Addendum   Patient was seen and examined by me, agree with the findings above.  I have reviewed and interpreted myself the cardiac cath as well as echocardiogram and discussed the findings and options with the patient.  She has two-vessel coronary artery disease and STEMI on the right coronary artery.  She underwent emergent PCI as well as attempt to PCI the circumflex with possible dissection.  She is pain-free now.  I do not see a need for surgical intervention.  I recommend to cool of the process and repeat angiography and possible reattempt PCI in the near future by the  cardiology team.  We will see the patient as needed if needed  Fab Cardenas MD

## 2023-03-25 NOTE — ED PROVIDER NOTES
Subjective   History of Present Illness  77-year-old female with no past medical history though she does have a remote history of tobacco abuse complains of severe substernal chest pain radiating to her neck onset 1 hour prior to arrival.  Patient has had pain like this waxing and waning over the past week but it has never been this bad or persistent.  EMS transported patient and gave her 4 mg of morphine and 4 of Zofran.  Patient had 6 baby aspirin's prior to arrival.        Review of Systems   Respiratory: Positive for shortness of breath.    Cardiovascular: Positive for chest pain.   All other systems reviewed and are negative.      History reviewed. No pertinent past medical history.    No Known Allergies    History reviewed. No pertinent surgical history.    Family History   Problem Relation Age of Onset   • No Known Problems Mother    • No Known Problems Father        Social History     Socioeconomic History   • Marital status:    Tobacco Use   • Smoking status: Former     Types: Cigarettes     Quit date: 3/13/2020     Years since quitting: 3.0   • Smokeless tobacco: Never   Vaping Use   • Vaping Use: Never used   Substance and Sexual Activity   • Alcohol use: Yes     Comment: occasional   • Drug use: Never   • Sexual activity: Not Currently           Objective   Physical Exam  Constitutional:       General: She is in acute distress.   HENT:      Head: Normocephalic and atraumatic.      Mouth/Throat:      Mouth: Mucous membranes are moist.      Pharynx: Oropharynx is clear.   Cardiovascular:      Rate and Rhythm: Normal rate and regular rhythm.      Heart sounds: Normal heart sounds.   Pulmonary:      Effort: Pulmonary effort is normal.      Breath sounds: Normal breath sounds.   Abdominal:      General: Bowel sounds are normal. There is no distension.      Palpations: Abdomen is soft.      Tenderness: There is no abdominal tenderness.   Musculoskeletal:         General: Normal range of motion.    Skin:     General: Skin is warm and dry.      Capillary Refill: Capillary refill takes less than 2 seconds.   Neurological:      General: No focal deficit present.      Mental Status: She is alert and oriented to person, place, and time.         Procedures           ED Course                                           Medical Decision Making  Initial EKG had very subtle ST elevation in 3 and aVF without any convincing reciprocal changes.  EKG was repeated when I saw the patient given her degree of pain with significant interval ST elevation in 2 3 and aVF with reciprocal ST depression anteriorly.  Cath Lab was activated emergently, case discussed with Dr. Carrasco who will take to the Cath Lab.    Critical care time 30 minutes.    ST elevation myocardial infarction (STEMI), unspecified artery (HCC): acute illness or injury  Amount and/or Complexity of Data Reviewed  Labs: ordered.  Radiology: ordered.  ECG/medicine tests: ordered.     Details: EKG interpretation: Normal sinus rhythm, rate 82, no STEMI seen    Repeat EKG, normal sinus rhythm, rate 75, STEMI now seen in inferior leads with ST elevation in 2 3 and aVF      Risk  Prescription drug management.  Decision regarding hospitalization.          Final diagnoses:   ST elevation myocardial infarction (STEMI), unspecified artery (HCC)       ED Disposition  ED Disposition     ED Disposition   Decision to Admit    Condition   --    Comment   --             No follow-up provider specified.       Medication List      No changes were made to your prescriptions during this visit.          Jacques Hu MD  03/25/23 4515

## 2023-03-25 NOTE — CONSULTS
Critical Care Consult Note   Finn Vyas : 1945 MRN:[id number] LOS:0 ROOM: [facility]     Reason for admission: ST elevation myocardial infarction (STEMI), unspecified artery (HCC)     Assessment / Plan     STEMI (ST elevation myocardial infarction), involving RCA  -Underwent urgent cardiac catheterization in which patient had HONEY placed in RCA  -Echocardiogram ordered per cardiology  -Continue Tridil for blood pressure  -Continue aspirin, heparin drip  -Beta-blocker and ACE inhibitor to be prescribed prior to discharge if necessary, will defer to cardiology  -Lipid panel in a.m., will initiate statin therapy  -Cardiothoracic surgery consult, per cardiology    Thrombocytopenia    --? Related to Integrilin  -Will repeat labs  -Oncology consulted          Nutrition: NPO Diet NPO Type: Sips with Meds     DVT Prophylaxis:   Mechanical Order History:     None      Pharmalogical Order History:      Ordered     Dose Route Frequency Stop    23 0534  heparin 29579 units/250 mL (100 units/mL) in 0.45 % NaCl infusion  7.48 mL/hr         12 Units/kg/hr IV Titrated --    23 0450  heparin 88479 units/250 mL (100 units/mL) in 0.45 % NaCl infusion  7.48 mL/hr,   Status:  Discontinued         12 Units/kg/hr IV Titrated 23 0534    23 0534  heparin bolus from bag 1,900 Units         30 Units/kg IV Every 6 Hours PRN --    23 0534  heparin bolus from bag 3,700 Units         60 Units/kg IV Every 6 Hours PRN --    23 0450  heparin bolus from bag 1,900 Units  Status:  Discontinued         30 Units/kg IV Every 6 Hours PRN 23 0534    23 0451  heparin bolus from bag 3,700 Units  Status:  Discontinued         60 Units/kg IV Every 6 Hours PRN 23 0534    23 0309  heparin (porcine) injection  Status:  Discontinued         -- -- Code / Trauma / Sedation Medication 23 0436    23 0153  heparin (porcine) 5000 UNIT/ML injection         -- IV Code / Trauma / Sedation  Medication 03/25/23 0153    03/25/23 0150  heparin (porcine) 1000 UNIT/ML injection  - ADS Override Pull        Note to Pharmacy: Created by cabinet override    -- -- -- 03/25/23 6508                 History of Present illness     A 77 y.o. old female patient with no documented past medical history presents to the hospital with complaints of chest pain.  Patient states her chest has been hurting off and on for approximately 1 week.  She states it is substernal pain that radiates up into her jaw both sides of her neck and in the same spot in her back she describes the pain as sharp stabbing and pressure.  She states she is nauseous when the pain is at full force and also diaphoretic.  She denies shortness of breath.  Patient states she has been continuing to work because she has to take care of her sister.  Patient was found to have a  STEMI in the emergency department and was taken urgently to Cath Lab.  Postintervention she was brought to the ICU for further evaluation and treatment.    ACP: Patient wishes to be full code with full intervention at this time; her son is her decision-maker if she is unable    Patient was seen and examined on 03/25/23 at 06:22 EDT .    Subjective / Review of systems     Review of Systems   Constitutional: Negative for diaphoresis and fatigue.   HENT: Positive for sore throat. Negative for congestion.         Left-sided neck sore   Eyes: Negative for pain and redness.   Respiratory: Negative for cough and shortness of breath.    Cardiovascular: Positive for chest pain. Negative for leg swelling.   Gastrointestinal: Negative for abdominal pain, nausea and vomiting.   Endocrine: Negative for polydipsia and polyphagia.   Genitourinary: Negative for dysuria and flank pain.   Musculoskeletal: Negative for back pain and joint swelling.   Skin: Negative for color change and pallor.   Neurological: Negative for dizziness and seizures.   Psychiatric/Behavioral: Negative for behavioral problems  and confusion.        Past Medical/Surgical/Social/Family History & Allergies     History reviewed. No pertinent past medical history.   History reviewed. No pertinent surgical history.   Social History     Socioeconomic History   • Marital status:    Tobacco Use   • Smoking status: Former     Types: Cigarettes     Quit date: 3/13/2020     Years since quitting: 3.0   • Smokeless tobacco: Never   Vaping Use   • Vaping Use: Never used   Substance and Sexual Activity   • Alcohol use: Not Currently   • Drug use: Never   • Sexual activity: Not Currently      History reviewed. No pertinent family history.   No Known Allergies     Home Medications     Prior to Admission medications    Medication Sig Start Date End Date Taking? Authorizing Provider   traMADol (ULTRAM) 50 MG tablet Take 1 tablet by mouth Every 8 (Eight) Hours As Needed for Moderate Pain . 1/21/20   Petey Dos Santos MD        Objective / Physical Exam     Vital signs:  Temp: 97.3 °F (36.3 °C)  BP: 133/71  Heart Rate: 78  Resp: 12  SpO2: 100 %  Weight: 62.4 kg (137 lb 9.6 oz)    Admission Weight: Weight: 62.4 kg (137 lb 9.6 oz)    Physical Exam  Vitals and nursing note reviewed.   Constitutional:       Appearance: Normal appearance.   HENT:      Head: Normocephalic.      Mouth/Throat:      Mouth: Mucous membranes are moist.      Pharynx: Oropharynx is clear.   Eyes:      Pupils: Pupils are equal, round, and reactive to light.      Comments: pterygium left eye   Cardiovascular:      Rate and Rhythm: Normal rate. Rhythm irregular.      Pulses: Normal pulses.      Heart sounds: Normal heart sounds.   Pulmonary:      Effort: Pulmonary effort is normal.      Breath sounds: Normal breath sounds.   Abdominal:      General: Bowel sounds are normal.      Palpations: Abdomen is soft.   Musculoskeletal:         General: Normal range of motion.      Cervical back: Normal range of motion.   Skin:     General: Skin is warm.      Capillary Refill: Capillary refill takes  2 to 3 seconds.   Neurological:      Mental Status: She is oriented to person, place, and time.   Psychiatric:         Mood and Affect: Mood normal.         Behavior: Behavior normal.          Labs     Results from last 7 days   Lab Units 03/25/23  0212   WBC 10*3/mm3 11.10*   HEMATOCRIT % 41.2   PLATELETS 10*3/mm3 12*      Results from last 7 days   Lab Units 03/25/23  0154   SODIUM mmol/L 139   POTASSIUM mmol/L 3.7   CHLORIDE mmol/L 101   CO2 mmol/L 27.0   BUN mg/dL 18   CREATININE mg/dL 0.76        Imaging     No radiology results for the last day       Current Medications     Scheduled Meds:  aspirin, 81 mg, Oral, Daily  heparin (porcine), , ,          Continuous Infusions:  heparin, 12 Units/kg/hr  nitroglycerin, 5-200 mcg/min, Last Rate: 5 mcg/min (03/25/23 0545)  nitroglycerin, 5-200 mcg/min  sodium chloride, 3 mL/kg/hr       Plan discussed with RN. Reviewed all other data in the last 24 hours, including but not limited to vitals, labs, microbiology, imaging and pertinent notes from other providers.  Plan also discussed with patient at the bedside.      PRECIOUS Norris   Critical Care  03/25/23   06:22 EDT     Electronically signed by PRECIOUS Norris, 03/25/23, 6:22 AM EDT.

## 2023-03-25 NOTE — H&P
Cardiology H&P  Nico Carrasco MD, PhD      Patient Care Team:  Fermin Payton MD as PCP - General (Family Medicine)    CHIEF COMPLAINT: Inferior STEMI    HISTORY OF PRESENT ILLNESS:    This is a 77-year-old female with no previous cardiac history who presented with chest pain that have been occurring over the past week and became unbearable this evening.  ST elevations were found in the inferior and inferolateral leads.  She was brought to the Cath Lab on a completely occluded RCA, proximal circumflex disease but angiographically nonobstructive LAD disease with KRISTAL-3 flow.  Given ongoing chest pain ST elevations with acute MI decision was made for intervention to the RCA which was successfully stented with overlapping 2.25 x 38 and 3.0 x 28 Xience drug-eluting stents postdilated to 3 mm at great angiographic results.  She remains with circumflex proximal disease that was unable to be crossed and needs staged approach versus consideration for bypass once acute resolution of  inferior infarct has stabilized.  She is being transferred to ICU chest pain-free after the intervention.  Sheath remains in place given the appearance of the circumflex, CV surgery consult placed for evaluation for their opinion on candidacy for bypass with multivessel disease, blood pressures are stable 120 systolic heart rates 80s to 90s sinus rhythm.    She has risk factors of family history as well as smoking, she is on no home medicines that we can see at this time    Review of systems otherwise negative x14 point review of systems except as mentioned above  Historical data copied forward from previous encounters in EMR is unchanged      No past medical history on file.  No past surgical history on file.  No family history on file.     Medications Prior to Admission   Medication Sig Dispense Refill Last Dose   • traMADol (ULTRAM) 50 MG tablet Take 1 tablet by mouth Every 8 (Eight) Hours As Needed for Moderate Pain . 12 tablet 0   "    Allergies:  Patient has no known allergies.    REVIEW OF SYSTEMS:  Please see the above history of present illness for pertinent positives and negatives.  The remainder of the patient's systems have been reviewed and are negative.     Vital Signs  Temp:  [97.3 °F (36.3 °C)] 97.3 °F (36.3 °C)  Heart Rate:  [62-97] 87  Resp:  [12-25] 12  BP: ()/(52-98) 143/84    Flowsheet Rows    Flowsheet Row First Filed Value   Admission Height 144.8 cm (57\") Documented at 03/25/2023 0132   Admission Weight 62.4 kg (137 lb 9.6 oz) Documented at 03/25/2023 0140           Physical Exam:  Physical Exam   Constitutional: Patient appears acute distress with chest pain on initial encounter  HEENT:   Head: Normocephalic and atraumatic.   Eyes:  Pupils are equal, round, and reactive to light. EOM are intact. Sclerae are anicteric and noninjected.  Mouth and Throat: Patient has moist mucous membranes. Oropharynx is clear of any erythema or exudate.     Neck: Neck supple. No JVD present. No thyromegaly present. No lymphadenopathy present.  Cardiovascular: Regular rate, regular rhythm, S1 normal and S2 normal.  Exam reveals no gallop and no friction rub.  No significant murmurs  Pulmonary/Chest: Lungs are clear to auscultation bilaterally. No respiratory distress. No wheezes. No rhonchi. No rales.   Abdominal: Soft. Bowel sounds are normal. No distension and no mass. There is no hepatosplenomegaly. There is no tenderness.   Musculoskeletal: Normal muscle tone  Extremities: No edema. Pulses are palpable in all 4 extremities.  Neurological: Patient is alert and oriented to person, place, and time. Cranial nerves II-XII are grossly intact with no focal deficits.  Skin: Skin is warm. No rash noted. Nails show no clubbing.  No cyanosis or erythema.     Results Review:    I reviewed the patient's new clinical results.  Lab Results (most recent)     Procedure Component Value Units Date/Time    CBC & Differential [[id number]]  (Abnormal) " Collected: 03/25/23 0212    Specimen: Blood Updated: 03/25/23 0247    Narrative:      The following orders were created for panel order CBC & Differential.  Procedure                               Abnormality         Status                     ---------                               -----------         ------                     CBC Auto Differential[[id number]]        Abnormal            Final result               Scan Slide[[id number]]                                       Final result               Path Consult Reflex[[id number]]                              Final result                 Please view results for these tests on the individual orders.    Path Consult Reflex [[id number]] Collected: 03/25/23 0212    Specimen: Blood Updated: 03/25/23 0247     Pathology Review Yes    CBC Auto Differential [[id number]]  (Abnormal) Collected: 03/25/23 0212    Specimen: Blood Updated: 03/25/23 0247     WBC 11.10 10*3/mm3      RBC 4.51 10*6/mm3      Hemoglobin 13.2 g/dL      Hematocrit 41.2 %      MCV 91.4 fL      MCH 29.2 pg      MCHC 31.9 g/dL      RDW 13.8 %      RDW-SD 43.3 fl      MPV 8.5 fL      Platelets 12 10*3/mm3      Neutrophil % 51.8 %      Lymphocyte % 37.3 %      Monocyte % 7.4 %      Eosinophil % 2.4 %      Basophil % 1.1 %      Neutrophils, Absolute 5.80 10*3/mm3      Lymphocytes, Absolute 4.10 10*3/mm3      Monocytes, Absolute 0.80 10*3/mm3      Eosinophils, Absolute 0.30 10*3/mm3      Basophils, Absolute 0.10 10*3/mm3      nRBC 0.1 /100 WBC     Narrative:      Appended report. These results have been appended to a previously verified report.    Scan Slide [[id number]] Collected: 03/25/23 0212    Specimen: Blood Updated: 03/25/23 0247     RBC Morphology Normal     WBC Morphology Normal     Platelet Estimate Decreased    Single High Sensitivity Troponin T [[id number]]  (Abnormal) Collected: 03/25/23 0154    Specimen: Blood Updated: 03/25/23 0225     HS Troponin T 291 ng/L     Narrative:      High Sensitive Troponin  T Reference Range:  <10.0 ng/L- Negative Female for AMI  <15.0 ng/L- Negative Male for AMI  >=10 - Abnormal Female indicating possible myocardial injury.  >=15 - Abnormal Male indicating possible myocardial injury.   Clinicians would have to utilize clinical acumen, EKG, Troponin, and serial changes to determine if it is an Acute Myocardial Infarction or myocardial injury due to an underlying chronic condition.         Comprehensive Metabolic Panel [[id number]]  (Abnormal) Collected: 03/25/23 0154    Specimen: Blood Updated: 03/25/23 0223     Glucose 144 mg/dL      BUN 18 mg/dL      Creatinine 0.76 mg/dL      Sodium 139 mmol/L      Potassium 3.7 mmol/L      Chloride 101 mmol/L      CO2 27.0 mmol/L      Calcium 9.5 mg/dL      Total Protein 6.1 g/dL      Albumin 3.5 g/dL      ALT (SGPT) 17 U/L      AST (SGOT) 53 U/L      Alkaline Phosphatase 90 U/L      Total Bilirubin 0.3 mg/dL      Globulin 2.6 gm/dL      A/G Ratio 1.3 g/dL      BUN/Creatinine Ratio 23.7     Anion Gap 11.0 mmol/L      eGFR 81.8 mL/min/1.73     Narrative:      GFR Normal >60  Chronic Kidney Disease <60  Kidney Failure <15    The GFR formula is only valid for adults with stable renal function between ages 18 and 70.    Protime-INR [[id number]]  (Normal) Collected: 03/25/23 0154    Specimen: Blood Updated: 03/25/23 0214     Protime 10.1 Seconds      INR 0.98    aPTT [[id number]]  (Abnormal) Collected: 03/25/23 0154    Specimen: Blood Updated: 03/25/23 0214     PTT 23.6 seconds           Imaging Results (Most Recent)     Procedure Component Value Units Date/Time    XR Chest 1 View [[id number]] Resulted: 03/25/23 0222     Updated: 03/25/23 0224        reviewed    ECG/EMG Results (most recent)     Procedure Component Value Units Date/Time    ECG 12 Lead Chest Pain [[id number]] Collected: 03/25/23 0141     Updated: 03/25/23 0142     QT Interval 379 ms     Narrative:      HEART RATE= 82  bpm  RR Interval= 732  ms  WV Interval= 132  ms  P Horizontal Axis= -9   deg  P Front Axis= 67  deg  QRSD Interval= 83  ms  QT Interval= 379  ms  QRS Axis= 61  deg  T Wave Axis= 112  deg  - ABNORMAL ECG -  Sinus rhythm  Repol abnrm suggests ischemia, lateral leads  Electronically Signed By:   Date and Time of Study: 2023-03-25 01:41:24        reviewed    Assessment & Plan     Inferior STEMI  Aspirin on board, continue heparin for now, sheath left in place  Still has unstable lesion in the circumflex proximally  Status post revascularization of the RCA  CBC demonstrates significant thrombocytopenia possibly secondary to Integrilin, stop Integrilin now, transfuse platelets if needed, repeat CBC in 2 hours as well as 6 hours  No bleeding at this time  Statin beta-blocker per guidelines  CV surgery consult for consideration of staged bypass surgery given appearance of the LAD and circumflex  Hold Brilinta for now, single dose was given in the Cath Lab  Transfer to ICU  If becomes unstable with respect to circumflex lesion balloon pump would be recommended with staged intervention  Supportive care at this point  Repeat labs, troponin, get 2D echo    Guarded condition    Nico Carrasco MD, PhD    I discussed the patient's findings and my recommendations with patient and staff    Nico Carrasco MD  03/25/23  04:55 EDT

## 2023-03-26 ENCOUNTER — APPOINTMENT (OUTPATIENT)
Dept: CARDIOLOGY | Facility: HOSPITAL | Age: 78
DRG: 247 | End: 2023-03-26
Payer: MEDICARE

## 2023-03-26 LAB
ACT BLD: 137 SECONDS (ref 89–137)
ALBUMIN SERPL-MCNC: 3.4 G/DL (ref 3.5–5.2)
ALBUMIN/GLOB SERPL: 1.5 G/DL
ALP SERPL-CCNC: 88 U/L (ref 39–117)
ALT SERPL W P-5'-P-CCNC: 21 U/L (ref 1–33)
ANION GAP SERPL CALCULATED.3IONS-SCNC: 9 MMOL/L (ref 5–15)
APTT PPP: 68 SECONDS (ref 61–76.5)
AST SERPL-CCNC: 47 U/L (ref 1–32)
BILIRUB SERPL-MCNC: 0.7 MG/DL (ref 0–1.2)
BUN SERPL-MCNC: 11 MG/DL (ref 8–23)
BUN/CREAT SERPL: 18 (ref 7–25)
CALCIUM SPEC-SCNC: 8.9 MG/DL (ref 8.6–10.5)
CHLORIDE SERPL-SCNC: 104 MMOL/L (ref 98–107)
CHOLEST SERPL-MCNC: 172 MG/DL (ref 0–200)
CO2 SERPL-SCNC: 24 MMOL/L (ref 22–29)
CREAT SERPL-MCNC: 0.61 MG/DL (ref 0.57–1)
DEPRECATED RDW RBC AUTO: 45.5 FL (ref 37–54)
EGFRCR SERPLBLD CKD-EPI 2021: 93.4 ML/MIN/1.73
ERYTHROCYTE [DISTWIDTH] IN BLOOD BY AUTOMATED COUNT: 13.8 % (ref 12.3–15.4)
GLOBULIN UR ELPH-MCNC: 2.3 GM/DL
GLUCOSE SERPL-MCNC: 94 MG/DL (ref 65–99)
HCT VFR BLD AUTO: 33.9 % (ref 34–46.6)
HDLC SERPL-MCNC: 50 MG/DL (ref 40–60)
HGB BLD-MCNC: 11.6 G/DL (ref 12–15.9)
LDLC SERPL CALC-MCNC: 99 MG/DL (ref 0–100)
LDLC/HDLC SERPL: 1.92 {RATIO}
MAGNESIUM SERPL-MCNC: 1.8 MG/DL (ref 1.6–2.4)
MAXIMAL PREDICTED HEART RATE: 145 BPM
MCH RBC QN AUTO: 30.6 PG (ref 26.6–33)
MCHC RBC AUTO-ENTMCNC: 34.1 G/DL (ref 31.5–35.7)
MCV RBC AUTO: 89.8 FL (ref 79–97)
PLATELET # BLD AUTO: 158 10*3/MM3 (ref 140–450)
PMV BLD AUTO: 8.1 FL (ref 6–12)
POTASSIUM SERPL-SCNC: 4 MMOL/L (ref 3.5–5.2)
PROT SERPL-MCNC: 5.7 G/DL (ref 6–8.5)
RBC # BLD AUTO: 3.78 10*6/MM3 (ref 3.77–5.28)
SODIUM SERPL-SCNC: 137 MMOL/L (ref 136–145)
STRESS TARGET HR: 123 BPM
TRIGL SERPL-MCNC: 130 MG/DL (ref 0–150)
TROPONIN T SERPL HS-MCNC: 1005 NG/L
VLDLC SERPL-MCNC: 23 MG/DL (ref 5–40)
WBC NRBC COR # BLD: 8.7 10*3/MM3 (ref 3.4–10.8)

## 2023-03-26 PROCEDURE — 93306 TTE W/DOPPLER COMPLETE: CPT

## 2023-03-26 PROCEDURE — 84484 ASSAY OF TROPONIN QUANT: CPT | Performed by: INTERNAL MEDICINE

## 2023-03-26 PROCEDURE — 93306 TTE W/DOPPLER COMPLETE: CPT | Performed by: INTERNAL MEDICINE

## 2023-03-26 PROCEDURE — 85027 COMPLETE CBC AUTOMATED: CPT

## 2023-03-26 PROCEDURE — 85347 COAGULATION TIME ACTIVATED: CPT

## 2023-03-26 PROCEDURE — 93005 ELECTROCARDIOGRAM TRACING: CPT | Performed by: INTERNAL MEDICINE

## 2023-03-26 PROCEDURE — 25010000002 ONDANSETRON PER 1 MG: Performed by: INTERNAL MEDICINE

## 2023-03-26 PROCEDURE — 83735 ASSAY OF MAGNESIUM: CPT | Performed by: INTERNAL MEDICINE

## 2023-03-26 PROCEDURE — 85730 THROMBOPLASTIN TIME PARTIAL: CPT | Performed by: INTERNAL MEDICINE

## 2023-03-26 PROCEDURE — 80053 COMPREHEN METABOLIC PANEL: CPT | Performed by: INTERNAL MEDICINE

## 2023-03-26 PROCEDURE — 25010000002 HEPARIN (PORCINE) 25000-0.45 UT/250ML-% SOLUTION: Performed by: INTERNAL MEDICINE

## 2023-03-26 PROCEDURE — 80061 LIPID PANEL: CPT | Performed by: INTERNAL MEDICINE

## 2023-03-26 PROCEDURE — 25010000002 FUROSEMIDE PER 20 MG: Performed by: INTERNAL MEDICINE

## 2023-03-26 RX ORDER — FUROSEMIDE 10 MG/ML
20 INJECTION INTRAMUSCULAR; INTRAVENOUS ONCE
Status: COMPLETED | OUTPATIENT
Start: 2023-03-26 | End: 2023-03-26

## 2023-03-26 RX ORDER — LOSARTAN POTASSIUM 25 MG/1
12.5 TABLET ORAL
Status: DISCONTINUED | OUTPATIENT
Start: 2023-03-26 | End: 2023-03-27

## 2023-03-26 RX ADMIN — METOPROLOL TARTRATE 25 MG: 25 TABLET, FILM COATED ORAL at 20:26

## 2023-03-26 RX ADMIN — ATORVASTATIN CALCIUM 80 MG: 40 TABLET, FILM COATED ORAL at 20:26

## 2023-03-26 RX ADMIN — HEPARIN SODIUM 12 UNITS/KG/HR: 10000 INJECTION, SOLUTION INTRAVENOUS at 23:38

## 2023-03-26 RX ADMIN — ONDANSETRON 4 MG: 2 INJECTION INTRAMUSCULAR; INTRAVENOUS at 17:18

## 2023-03-26 RX ADMIN — TICAGRELOR 90 MG: 90 TABLET ORAL at 20:26

## 2023-03-26 RX ADMIN — ASPIRIN 81 MG CHEWABLE TABLET 81 MG: 81 TABLET CHEWABLE at 08:48

## 2023-03-26 RX ADMIN — METOPROLOL TARTRATE 25 MG: 25 TABLET, FILM COATED ORAL at 08:48

## 2023-03-26 RX ADMIN — TICAGRELOR 90 MG: 90 TABLET ORAL at 08:47

## 2023-03-26 RX ADMIN — FUROSEMIDE 20 MG: 10 INJECTION, SOLUTION INTRAMUSCULAR; INTRAVENOUS at 17:18

## 2023-03-26 RX ADMIN — LOSARTAN POTASSIUM 12.5 MG: 25 TABLET, FILM COATED ORAL at 17:18

## 2023-03-26 NOTE — PROGRESS NOTES
Critical Care Progress Note   Finn Vyas : 1945 MRN:[id number] LOS:1     Principal Problem: ST elevation myocardial infarction (STEMI), unspecified artery (HCC)     Reason for follow up: All the medical problems listed below    Summary     A 77 y.o. female admitted with a principal diagnosis of ST elevation myocardial infarction (STEMI), unspecified artery (HCC).      Significant events     23 : Sheath was kept in place yesterday.  Pt remains on heparin and NTG gtt's.      Assessment / Plan     STEMI (ST elevation myocardial infarction), involving RCA  -Underwent urgent cardiac catheterization in which patient had HONEY placed in RCA  -On losartan, metoprolol for HTN  -A1c wnl.  -Lipid panel pending.  On atorva 80 mg.  -On aspirin, Brillinta, heparin gtt  -Beta-blocker and ACE inhibitor to be prescribed prior to discharge if necessary, will defer to cardiology  -Lipid panel in a.m., will initiate statin therapy  -Cardiothoracic surgery consult, per cardiology  -Heparin gtt stopped today.    -On NTG gtt.  -Defer primary mgmt to cardiology.           Code status:   Level Of Support Discussed With: Patient  Code Status (Patient has no pulse and is not breathing): CPR (Attempt to Resuscitate)  Medical Interventions (Patient has pulse or is breathing): Full Support  Release to patient: Routine Release       Nutrition: Diet: Cardiac Diets; Healthy Heart (2-3 Na+); Texture: Regular Texture (IDDSI 7); Fluid Consistency: Thin (IDDSI 0)   Patient isn't on Tube Feeding    DVT prophylaxis:   Mechanical Order History:     None      Pharmalogical Order History:      Ordered     Dose Route Frequency Stop    23 0534  heparin 44545 units/250 mL (100 units/mL) in 0.45 % NaCl infusion  7.48 mL/hr         12 Units/kg/hr IV Titrated --    23 0450  heparin 14600 units/250 mL (100 units/mL) in 0.45 % NaCl infusion  7.48 mL/hr,   Status:  Discontinued         12 Units/kg/hr IV Titrated 23 0534    23  0534  heparin bolus from bag 1,900 Units         30 Units/kg IV Every 6 Hours PRN --    03/25/23 0534  heparin bolus from bag 3,700 Units         60 Units/kg IV Every 6 Hours PRN --    03/25/23 0450  heparin bolus from bag 1,900 Units  Status:  Discontinued         30 Units/kg IV Every 6 Hours PRN 03/25/23 0534    03/25/23 0451  heparin bolus from bag 3,700 Units  Status:  Discontinued         60 Units/kg IV Every 6 Hours PRN 03/25/23 0534    03/25/23 0309  heparin (porcine) injection  Status:  Discontinued         -- -- Code / Trauma / Sedation Medication 03/25/23 0436    03/25/23 0153  heparin (porcine) 5000 UNIT/ML injection         -- IV Code / Trauma / Sedation Medication 03/25/23 0153    03/25/23 0150  heparin (porcine) 1000 UNIT/ML injection  - ADS Override Pull        Note to Pharmacy: Created by cabinet override    -- -- -- 03/25/23 1351                 Subjective / Review of systems     Review of Systems   Pt denies SOB or CP currently.  Denies f/c/s/n/v.  Still has H/A.    Objective / Physical Exam   Vital signs:  Temp: 97.9 °F (36.6 °C)  BP: 127/75  Heart Rate: 73  Resp: 11  SpO2: 97 %  Weight: 61.2 kg (135 lb)    Admission Weight: Weight: 62.4 kg (137 lb 9.6 oz)  Current Weight: Weight: 61.2 kg (135 lb)    Input/Output in last 24 hours:    Intake/Output Summary (Last 24 hours) at 3/26/2023 1425  Last data filed at 3/26/2023 0543  Gross per 24 hour   Intake 1173 ml   Output 200 ml   Net 973 ml      Physical Exam   GEN:  Pleasant, thin, elderly woman. WD/WH.  Appears older than stated age.  Sitting up in bed on NC.  NAD.  NEURO:  Brainstem reflexes intact.  No obvious focal deficit.  Moves all 4 ext.  HEENT:  N/AT.  PERRL.  MMM.  Oropharynx non-erythematous.  No drainage from the eyes/ears/nose.  No conjunctival petechiae.  No oral thrush.  Auditory and visual acuity grossly wnl.  Good dentition.  Voice normal.  NECK:  Supple, NT, trachea midline.  No meningismus.  No ROM limitation.  No torticollis.  No  JVD.  No thyromegaly.    CHEST/LUNGS:  Breath sounds are clear, diminished, and equal bilaterally.  No w/r/r.  Chest excursion equal bilaterally.    CARDIOVASCULAR:  RRR w/o murmur noted.  GI:  Abdomen soft, NT, ND, +BS.  No HSM.  :  Deferred.  EXTREMITIES:  No deformity or amputation.  No cyanosis, edema, or asymmetry.  Pulses 2+ and equal in BLE's.    SKIN:  Warm, dry, and pink.  No rash, breakdown, or track marks noted.  LYMPHATICS/HEME:  No overt LAD or bruising. Cath site looks ok, sheath still in place.  MSK:  Normal ROM.  No joint abnormalities noted.  Strength is 5/5 and equal in BUE and BLE's.  PSYCH:  Pleasant.  A&Ox 3.  Normal mood and affect.  Responds appropriately to commands and appears to comprehend instructions.      Radiology and Labs     Results from last 7 days   Lab Units 03/26/23  0543 03/25/23  0605 03/25/23  0212   WBC 10*3/mm3 8.70 10.60 11.10*   HEMATOCRIT % 33.9* 38.3 41.2   PLATELETS 10*3/mm3 158 191 184      Results from last 7 days   Lab Units 03/26/23  0543 03/25/23  0154   SODIUM mmol/L 137 139   POTASSIUM mmol/L 4.0 3.7   CHLORIDE mmol/L 104 101   CO2 mmol/L 24.0 27.0   BUN mg/dL 11 18   CREATININE mg/dL 0.61 0.76      Current medications   Scheduled Meds: aspirin, 81 mg, Oral, Daily  atorvastatin, 80 mg, Oral, Nightly  atropine sulfate, , ,   furosemide, 20 mg, Intravenous, Once  losartan, 12.5 mg, Oral, Q24H  metoprolol tartrate, 25 mg, Oral, Q12H  ticagrelor, 90 mg, Oral, BID      Continuous Infusions: heparin, 12 Units/kg/hr, Last Rate: Stopped (03/26/23 1100)  nitroglycerin, 5-200 mcg/min, Last Rate: Stopped (03/26/23 0849)        Plan discussed with RN. Reviewed all other data in the last 24 hours, including but not limited to vitals, labs, microbiology, imaging and pertinent notes from other providers.     Oni Gomez, DO   Critical Care  03/26/23   14:25 EDT

## 2023-03-26 NOTE — PROGRESS NOTES
Cardiology H&P  Nico Carrasco MD, PhD      Patient Care Team:  Fermin Payton MD as PCP - General (Family Medicine)    CHIEF COMPLAINT: Inferior STEMI    HISTORY OF PRESENT ILLNESS:    This is a 77-year-old female with no previous cardiac history who presented with chest pain that have been occurring over the past week and became unbearable this evening.  ST elevations were found in the inferior and inferolateral leads.  She was brought to the Cath Lab on a completely occluded RCA, proximal circumflex disease but angiographically nonobstructive LAD disease with KRISTAL-3 flow.  Given ongoing chest pain ST elevations with acute MI decision was made for intervention to the RCA which was successfully stented with overlapping 2.25 x 38 and 3.0 x 28 Xience drug-eluting stents postdilated to 3 mm at great angiographic results.  She remains with circumflex proximal disease that was unable to be crossed and needs staged approach versus consideration for bypass once acute resolution of  inferior infarct has stabilized.  She is being transferred to ICU chest pain-free after the intervention.  Sheath remains in place given the appearance of the circumflex, CV surgery consult placed for evaluation for their opinion on candidacy for bypass with multivessel disease, blood pressures are stable 130 systolic heart rates 80s to 90s sinus rhythm.    She has risk factors of family history as well as smoking, she is on no home medicines that we can see at this time  ============================================  Seen, continues to have some chest pain status post RCA intervention  Nitro drip on board  As needed pain medicines  2D echo reviewed, basal to mid inferolateral wall appears severely hypokinetic, RV mildly hypokinetic after ischemic injury with RCA STEMI status post revascularization  Significant lesion remains in the circumflex    Stop heparin drip for now, remove sheath today  Continue Brilinta  Off  "Integrilin  Thrombocytopenia has recovered  IVC slightly enlarged by 2D echo, will give single dose Lasix  High-sensitivity troponin up to 1005  Repeat in 8 hours, continue to repeat find peak    Review of systems otherwise negative x14 point review of systems except as mentioned above  Historical data copied forward from previous encounters in EMR is unchanged      History reviewed. No pertinent past medical history.  History reviewed. No pertinent surgical history.  Family History   Problem Relation Age of Onset    No Known Problems Mother     No Known Problems Father      Social History     Tobacco Use    Smoking status: Former     Types: Cigarettes     Quit date: 3/13/2020     Years since quitting: 3.0    Smokeless tobacco: Never   Vaping Use    Vaping Use: Never used   Substance Use Topics    Alcohol use: Yes     Comment: occasional    Drug use: Never     Medications Prior to Admission   Medication Sig Dispense Refill Last Dose    traMADol (ULTRAM) 50 MG tablet Take 1 tablet by mouth Every 8 (Eight) Hours As Needed for Moderate Pain . 12 tablet 0      Allergies:  Patient has no known allergies.    REVIEW OF SYSTEMS:  Please see the above history of present illness for pertinent positives and negatives.  The remainder of the patient's systems have been reviewed and are negative.     Vital Signs  Temp:  [97.9 °F (36.6 °C)-98.2 °F (36.8 °C)] 98.2 °F (36.8 °C)  Heart Rate:  [62-81] 81  Resp:  [11-21] 21  BP: ()/(58-77) 119/63    Flowsheet Rows      Flowsheet Row First Filed Value   Admission Height 144.8 cm (57\") Documented at 03/25/2023 0132   Admission Weight 62.4 kg (137 lb 9.6 oz) Documented at 03/25/2023 0140             Physical Exam:  Physical Exam   Constitutional: Patient appears acute distress with chest pain on initial encounter  HEENT:   Head: Normocephalic and atraumatic.   Eyes:  Pupils are equal, round, and reactive to light. EOM are intact. Sclerae are anicteric and noninjected.  Mouth and " Throat: Patient has moist mucous membranes. Oropharynx is clear of any erythema or exudate.     Neck: Neck supple. No JVD present. No thyromegaly present. No lymphadenopathy present.  Cardiovascular: Regular rate, regular rhythm, S1 normal and S2 normal.  Exam reveals no gallop and no friction rub.  No significant murmurs  Pulmonary/Chest: Lungs are clear to auscultation bilaterally. No respiratory distress. No wheezes. No rhonchi. No rales.   Abdominal: Soft. Bowel sounds are normal. No distension and no mass. There is no hepatosplenomegaly. There is no tenderness.   Musculoskeletal: Normal muscle tone  Extremities: No edema. Pulses are palpable in all 4 extremities.  Neurological: Patient is alert and oriented to person, place, and time. Cranial nerves II-XII are grossly intact with no focal deficits.  Skin: Skin is warm. No rash noted. Nails show no clubbing.  No cyanosis or erythema.  Sheath right groin, no bleeding     Results Review:    I reviewed the patient's new clinical results.  Lab Results (most recent)       Procedure Component Value Units Date/Time    CBC & Differential [[id number]]  (Abnormal) Collected: 03/25/23 0212    Specimen: Blood Updated: 03/25/23 0247    Narrative:      The following orders were created for panel order CBC & Differential.  Procedure                               Abnormality         Status                     ---------                               -----------         ------                     CBC Auto Differential[[id number]]        Abnormal            Final result               Scan Slide[[id number]]                                       Final result               Path Consult Reflex[[id number]]                              Final result                 Please view results for these tests on the individual orders.    Path Consult Reflex [[id number]] Collected: 03/25/23 0212    Specimen: Blood Updated: 03/25/23 0247     Pathology Review Yes    CBC Auto Differential [[id number]]   (Abnormal) Collected: 03/25/23 0212    Specimen: Blood Updated: 03/25/23 0247     WBC 11.10 10*3/mm3      RBC 4.51 10*6/mm3      Hemoglobin 13.2 g/dL      Hematocrit 41.2 %      MCV 91.4 fL      MCH 29.2 pg      MCHC 31.9 g/dL      RDW 13.8 %      RDW-SD 43.3 fl      MPV 8.5 fL      Platelets 12 10*3/mm3      Neutrophil % 51.8 %      Lymphocyte % 37.3 %      Monocyte % 7.4 %      Eosinophil % 2.4 %      Basophil % 1.1 %      Neutrophils, Absolute 5.80 10*3/mm3      Lymphocytes, Absolute 4.10 10*3/mm3      Monocytes, Absolute 0.80 10*3/mm3      Eosinophils, Absolute 0.30 10*3/mm3      Basophils, Absolute 0.10 10*3/mm3      nRBC 0.1 /100 WBC     Narrative:      Appended report. These results have been appended to a previously verified report.    Scan Slide [[id number]] Collected: 03/25/23 0212    Specimen: Blood Updated: 03/25/23 0247     RBC Morphology Normal     WBC Morphology Normal     Platelet Estimate Decreased    Single High Sensitivity Troponin T [[id number]]  (Abnormal) Collected: 03/25/23 0154    Specimen: Blood Updated: 03/25/23 0225     HS Troponin T 291 ng/L     Narrative:      High Sensitive Troponin T Reference Range:  <10.0 ng/L- Negative Female for AMI  <15.0 ng/L- Negative Male for AMI  >=10 - Abnormal Female indicating possible myocardial injury.  >=15 - Abnormal Male indicating possible myocardial injury.   Clinicians would have to utilize clinical acumen, EKG, Troponin, and serial changes to determine if it is an Acute Myocardial Infarction or myocardial injury due to an underlying chronic condition.         Comprehensive Metabolic Panel [[id number]]  (Abnormal) Collected: 03/25/23 0154    Specimen: Blood Updated: 03/25/23 0223     Glucose 144 mg/dL      BUN 18 mg/dL      Creatinine 0.76 mg/dL      Sodium 139 mmol/L      Potassium 3.7 mmol/L      Chloride 101 mmol/L      CO2 27.0 mmol/L      Calcium 9.5 mg/dL      Total Protein 6.1 g/dL      Albumin 3.5 g/dL      ALT (SGPT) 17 U/L      AST (SGOT) 53  U/L      Alkaline Phosphatase 90 U/L      Total Bilirubin 0.3 mg/dL      Globulin 2.6 gm/dL      A/G Ratio 1.3 g/dL      BUN/Creatinine Ratio 23.7     Anion Gap 11.0 mmol/L      eGFR 81.8 mL/min/1.73     Narrative:      GFR Normal >60  Chronic Kidney Disease <60  Kidney Failure <15    The GFR formula is only valid for adults with stable renal function between ages 18 and 70.    Protime-INR [[id number]]  (Normal) Collected: 03/25/23 0154    Specimen: Blood Updated: 03/25/23 0214     Protime 10.1 Seconds      INR 0.98    aPTT [[id number]]  (Abnormal) Collected: 03/25/23 0154    Specimen: Blood Updated: 03/25/23 0214     PTT 23.6 seconds             Imaging Results (Most Recent)       Procedure Component Value Units Date/Time    XR Chest 1 View [[id number]] Collected: 03/25/23 0724     Updated: 03/25/23 0728    Narrative:      XR CHEST 1 VW    Date of Exam: 3/25/2023 2:22 AM EDT    Indication: cp.    Comparison: August 11, 2021    Findings:  The heart looks enlarged. The lungs seem relatively clear. There are no pleural effusions.      Impression:      Impression:  1.Cardiomegaly.    Electronically Signed: Paulo Pena    3/25/2023 7:26 AM EDT    Workstation ID: [id number]          reviewed    ECG/EMG Results (most recent)       Procedure Component Value Units Date/Time    ECG 12 Lead Chest Pain [[id number]] Collected: 03/25/23 0522     Updated: 03/25/23 0524     QT Interval 390 ms     Narrative:      HEART RATE= 80  bpm  RR Interval= 748  ms  SD Interval= 131  ms  P Horizontal Axis= -2  deg  P Front Axis= 59  deg  QRSD Interval= 86  ms  QT Interval= 390  ms  QRS Axis= 57  deg  T Wave Axis= 140  deg  - ABNORMAL ECG -  Sinus rhythm  Repol abnrm suggests ischemia, diffuse leads  ST elevation, consider inferior injury  When compared with ECG of 25-Mar-2023 1:49:49,  New or worsened ischemia or infarction  Electronically Signed By:   Date and Time of Study: 2023-03-25 05:22:27    ECG 12 Lead Chest Pain [[id number]] Collected:  03/25/23 0141     Updated: 03/25/23 1233     QT Interval 379 ms     Narrative:      HEART RATE= 82  bpm  RR Interval= 732  ms  WV Interval= 132  ms  P Horizontal Axis= -9  deg  P Front Axis= 67  deg  QRSD Interval= 83  ms  QT Interval= 379  ms  QRS Axis= 61  deg  T Wave Axis= 112  deg  - ABNORMAL ECG -  Sinus rhythm  Repol abnrm suggests ischemia, lateral leads  No previous ECG available for comparison  Electronically Signed By: Jacques Hu (Rodger) 25-Mar-2023 12:32:57  Date and Time of Study: 2023-03-25 01:41:24    ECG 12 Lead [[id number]] Collected: 03/26/23 0439     Updated: 03/26/23 0442     QT Interval 384 ms     Narrative:      HEART RATE= 76  bpm  RR Interval= 792  ms  WV Interval= 127  ms  P Horizontal Axis= 14  deg  P Front Axis= 66  deg  QRSD Interval= 79  ms  QT Interval= 384  ms  QRS Axis= 49  deg  T Wave Axis= 174  deg  - ABNORMAL ECG -  Sinus rhythm  Repol abnrm suggests ischemia, anterolateral  Electronically Signed By:   Date and Time of Study: 2023-03-26 04:39:55    Adult Transthoracic Echo Complete W/ Cont if Necessary Per Protocol [[id number]] Resulted: 03/26/23 1033     Updated: 03/26/23 1040     Target HR (85%) 123 bpm      Max. Pred. HR (100%) 145 bpm     Narrative:        Left ventricular systolic function is low normal. Left ventricular   ejection fraction appears to be 51 - 55%.    Mildly reduced right ventricular systolic function noted.    Left atrial volume is mildly increased.    Moderate mitral valve regurgitation is present.    Estimated right ventricular systolic pressure from tricuspid   regurgitation is normal (<35 mmHg).    Basal to mid inferior and inferolateral wall are hypokinetic, ischemic   injury  Other segments contract normally  Overall EF appears 50%  Moderate MR eccentric, mild left atrial enlargement  Aortic valve is normal  IVC mildly enlarged with right atrial pressure estimated 10-15  No masses or effusions  RV mildly hypokinetic, normal size             reviewed    Assessment & Plan     Inferior STEMI  Aspirin on board, continue heparin for now, sheath left in place  Still has unstable lesion in the circumflex proximally  Status post revascularization of the RCA  CBC demonstrates significant thrombocytopenia possibly secondary to Integrilin, stop Integrilin now, transfuse platelets if needed, repeat CBC in 2 hours as well as 6 hours  No bleeding at this time  Statin beta-blocker per guidelines  CV surgery consult for consideration of staged bypass surgery given appearance of the LAD and circumflex, conservative approach for now, we will continue to evaluate for need  Continue Brilinta  Off Integrilin  Remove sheath today  Restart heparin drip 6 to 8 hours status post sheath removal    If becomes unstable with respect to circumflex lesion balloon pump would be recommended with staged intervention versus bypass surgery  Continue close observation    ICU care continues to be needed  Guarded condition    Nico Carrasco MD, PhD    I discussed the patient's findings and my recommendations with patient and staff    Nico Carrasco MD  03/26/23  10:41 EDT

## 2023-03-27 LAB
ALBUMIN SERPL-MCNC: 3.4 G/DL (ref 3.5–5.2)
ALBUMIN/GLOB SERPL: 1.3 G/DL
ALP SERPL-CCNC: 90 U/L (ref 39–117)
ALT SERPL W P-5'-P-CCNC: 19 U/L (ref 1–33)
ANION GAP SERPL CALCULATED.3IONS-SCNC: 11 MMOL/L (ref 5–15)
APTT PPP: 48.4 SECONDS (ref 61–76.5)
APTT PPP: 49.6 SECONDS (ref 61–76.5)
APTT PPP: 66 SECONDS (ref 61–76.5)
AST SERPL-CCNC: 31 U/L (ref 1–32)
BILIRUB SERPL-MCNC: 1 MG/DL (ref 0–1.2)
BUN SERPL-MCNC: 12 MG/DL (ref 8–23)
BUN/CREAT SERPL: 18.2 (ref 7–25)
CALCIUM SPEC-SCNC: 8.9 MG/DL (ref 8.6–10.5)
CHLORIDE SERPL-SCNC: 104 MMOL/L (ref 98–107)
CO2 SERPL-SCNC: 24 MMOL/L (ref 22–29)
CREAT SERPL-MCNC: 0.66 MG/DL (ref 0.57–1)
DEPRECATED RDW RBC AUTO: 46.8 FL (ref 37–54)
EGFRCR SERPLBLD CKD-EPI 2021: 91.6 ML/MIN/1.73
ERYTHROCYTE [DISTWIDTH] IN BLOOD BY AUTOMATED COUNT: 13.9 % (ref 12.3–15.4)
GEN 5 2HR TROPONIN T REFLEX: 667 NG/L
GLOBULIN UR ELPH-MCNC: 2.6 GM/DL
GLUCOSE SERPL-MCNC: 103 MG/DL (ref 65–99)
HCT VFR BLD AUTO: 32.6 % (ref 34–46.6)
HGB BLD-MCNC: 10.9 G/DL (ref 12–15.9)
MAGNESIUM SERPL-MCNC: 1.9 MG/DL (ref 1.6–2.4)
MCH RBC QN AUTO: 30.2 PG (ref 26.6–33)
MCHC RBC AUTO-ENTMCNC: 33.2 G/DL (ref 31.5–35.7)
MCV RBC AUTO: 91 FL (ref 79–97)
PLATELET # BLD AUTO: 118 10*3/MM3 (ref 140–450)
PLATELETS (CITRATED) BY AUTOMATED COUNT: 118 10*3/MM3 (ref 140–450)
PMV BLD AUTO: 9 FL (ref 6–12)
POTASSIUM SERPL-SCNC: 3.9 MMOL/L (ref 3.5–5.2)
PROT SERPL-MCNC: 6 G/DL (ref 6–8.5)
RBC # BLD AUTO: 3.59 10*6/MM3 (ref 3.77–5.28)
SODIUM SERPL-SCNC: 139 MMOL/L (ref 136–145)
TROPONIN T DELTA: -338 NG/L
TROPONIN T SERPL HS-MCNC: 767 NG/L
WBC NRBC COR # BLD: 6.6 10*3/MM3 (ref 3.4–10.8)
WHOLE BLOOD HOLD COAG: NORMAL

## 2023-03-27 PROCEDURE — 0 MAGNESIUM SULFATE 4 GM/100ML SOLUTION

## 2023-03-27 PROCEDURE — 85730 THROMBOPLASTIN TIME PARTIAL: CPT | Performed by: INTERNAL MEDICINE

## 2023-03-27 PROCEDURE — 25010000002 ONDANSETRON PER 1 MG: Performed by: INTERNAL MEDICINE

## 2023-03-27 PROCEDURE — 84484 ASSAY OF TROPONIN QUANT: CPT | Performed by: STUDENT IN AN ORGANIZED HEALTH CARE EDUCATION/TRAINING PROGRAM

## 2023-03-27 PROCEDURE — 85049 AUTOMATED PLATELET COUNT: CPT | Performed by: INTERNAL MEDICINE

## 2023-03-27 PROCEDURE — 93005 ELECTROCARDIOGRAM TRACING: CPT | Performed by: INTERNAL MEDICINE

## 2023-03-27 PROCEDURE — 25010000002 MORPHINE PER 10 MG: Performed by: INTERNAL MEDICINE

## 2023-03-27 PROCEDURE — 84484 ASSAY OF TROPONIN QUANT: CPT | Performed by: INTERNAL MEDICINE

## 2023-03-27 PROCEDURE — 80053 COMPREHEN METABOLIC PANEL: CPT | Performed by: INTERNAL MEDICINE

## 2023-03-27 PROCEDURE — 25010000002 HEPARIN (PORCINE) 25000-0.45 UT/250ML-% SOLUTION: Performed by: INTERNAL MEDICINE

## 2023-03-27 PROCEDURE — 83735 ASSAY OF MAGNESIUM: CPT | Performed by: INTERNAL MEDICINE

## 2023-03-27 PROCEDURE — 85027 COMPLETE CBC AUTOMATED: CPT

## 2023-03-27 RX ORDER — NITROGLYCERIN 0.4 MG/1
TABLET SUBLINGUAL
Status: COMPLETED
Start: 2023-03-27 | End: 2023-03-27

## 2023-03-27 RX ORDER — SODIUM CHLORIDE 9 MG/ML
100 INJECTION, SOLUTION INTRAVENOUS CONTINUOUS
Status: DISPENSED | OUTPATIENT
Start: 2023-03-27 | End: 2023-03-28

## 2023-03-27 RX ORDER — MORPHINE SULFATE 2 MG/ML
1 INJECTION, SOLUTION INTRAMUSCULAR; INTRAVENOUS ONCE
Status: COMPLETED | OUTPATIENT
Start: 2023-03-27 | End: 2023-03-27

## 2023-03-27 RX ORDER — LOPERAMIDE HYDROCHLORIDE 2 MG/1
2 CAPSULE ORAL 4 TIMES DAILY PRN
Status: DISCONTINUED | OUTPATIENT
Start: 2023-03-27 | End: 2023-04-01 | Stop reason: HOSPADM

## 2023-03-27 RX ORDER — NITROGLYCERIN 0.4 MG/1
0.4 TABLET SUBLINGUAL
Status: DISCONTINUED | OUTPATIENT
Start: 2023-03-27 | End: 2023-04-01 | Stop reason: HOSPADM

## 2023-03-27 RX ADMIN — HEPARIN SODIUM 14 UNITS/KG/HR: 10000 INJECTION, SOLUTION INTRAVENOUS at 18:36

## 2023-03-27 RX ADMIN — SODIUM CHLORIDE 100 ML/HR: 9 INJECTION, SOLUTION INTRAVENOUS at 23:07

## 2023-03-27 RX ADMIN — NITROGLYCERIN 0.4 MG: 0.4 TABLET SUBLINGUAL at 21:27

## 2023-03-27 RX ADMIN — ATORVASTATIN CALCIUM 80 MG: 40 TABLET, FILM COATED ORAL at 20:09

## 2023-03-27 RX ADMIN — TICAGRELOR 90 MG: 90 TABLET ORAL at 10:05

## 2023-03-27 RX ADMIN — ACETAMINOPHEN 650 MG: 325 TABLET, FILM COATED ORAL at 05:35

## 2023-03-27 RX ADMIN — ONDANSETRON 4 MG: 2 INJECTION INTRAMUSCULAR; INTRAVENOUS at 21:06

## 2023-03-27 RX ADMIN — LOPERAMIDE HYDROCHLORIDE 2 MG: 2 CAPSULE ORAL at 20:09

## 2023-03-27 RX ADMIN — TICAGRELOR 90 MG: 90 TABLET ORAL at 20:09

## 2023-03-27 RX ADMIN — LOSARTAN POTASSIUM 12.5 MG: 25 TABLET, FILM COATED ORAL at 10:06

## 2023-03-27 RX ADMIN — NITROGLYCERIN: 0.4 TABLET SUBLINGUAL at 21:21

## 2023-03-27 RX ADMIN — SODIUM CHLORIDE 500 ML: 9 INJECTION, SOLUTION INTRAVENOUS at 20:10

## 2023-03-27 RX ADMIN — ASPIRIN 81 MG CHEWABLE TABLET 81 MG: 81 TABLET CHEWABLE at 10:06

## 2023-03-27 RX ADMIN — SODIUM CHLORIDE 500 ML: 9 INJECTION, SOLUTION INTRAVENOUS at 20:09

## 2023-03-27 RX ADMIN — LOPERAMIDE HYDROCHLORIDE 2 MG: 2 CAPSULE ORAL at 06:17

## 2023-03-27 RX ADMIN — METOPROLOL TARTRATE 25 MG: 25 TABLET, FILM COATED ORAL at 10:06

## 2023-03-27 RX ADMIN — MORPHINE SULFATE 1 MG: 2 INJECTION, SOLUTION INTRAMUSCULAR; INTRAVENOUS at 23:05

## 2023-03-27 RX ADMIN — MAGNESIUM SULFATE HEPTAHYDRATE 4 G: 40 INJECTION, SOLUTION INTRAVENOUS at 04:38

## 2023-03-27 NOTE — CASE MANAGEMENT/SOCIAL WORK
Discharge Planning Assessment   Patrice     Patient Name: Finn Vyas  MRN: [id number]  Today's Date: 3/27/2023    Admit Date: 3/25/2023    Plan: SC Plan: Anticipate Routine Home alone. Brilinta will be affordable at $42 per month with first month free through meds to bed.   Discharge Needs Assessment     Row Name 03/27/23 1655       Living Environment    People in Home alone    Current Living Arrangements home    Potentially Unsafe Housing Conditions none    Primary Care Provided by self    Provides Primary Care For no one    Family Caregiver if Needed child(yamila), adult    Family Caregiver Names Son - Bhupendra Etienne    Quality of Family Relationships unable to assess    Able to Return to Prior Arrangements yes       Resource/Environmental Concerns    Resource/Environmental Concerns none    Transportation Concerns none       Food Insecurity    Within the past 12 months, you worried that your food would run out before you got the money to buy more. Never true    Within the past 12 months, the food you bought just didn't last and you didn't have money to get more. Never true       Transition Planning    Patient/Family Anticipates Transition to home    Patient/Family Anticipated Services at Transition none    Transportation Anticipated family or friend will provide       Discharge Needs Assessment    Readmission Within the Last 30 Days no previous admission in last 30 days    Equipment Currently Used at Home none    Concerns to be Addressed medication    Concerns Comments Brilinta would barely be affordable and patient states she may have to make cuts elsewhere, specifically electric usage to afford medication while she isnt working.    Anticipated Changes Related to Illness none    Equipment Needed After Discharge none    Provided Post Acute Provider List? N/A    Provided Post Acute Provider Quality & Resource List? N/A    Current Discharge Risk lives alone               Discharge Plan     Row Name 03/27/23 1658        Plan    Plan DC Plan: Anticipate Routine Home alone. Brilinta will be affordable at $42 per month with first month free through meds to bed.    Provided Post Acute Provider List? N/A    Provided Post Acute Provider Quality & Resource List? N/A    Plan Comments CM spoke with patient at bedside to discuss admission assessment and discharge planning. Patient confirms PCP and pharmacy. Patient denies any difficulty affording medications at this time. Patient confirms she is agreeable to enrolling in meds to bed program. CM enrolled patient and updated pharmacy in [employer]. Patient denies any additional needs for services or DME at this time. CM reviewed cost analysis for Brilinta with patient. Patient states it will be tight, especially while not working, but she will try to make it work. CM educated patient on importance of not missing doses and to contact her Cardiologist before she runs out if at any time she feels like she cannot afford it. Patient verbalized understanding. CM informed nurse of conversation and nurse informed CM that cardiologist has already offered samples if she is unable to afford.CM will continue to follow for any further needs and adjust discharge plan accordingly. DC Barriers: Heparin GTT, CABG workup pending.           Expected Discharge Date and Time     Expected Discharge Date Expected Discharge Time    Mar 29, 2023          Demographic Summary     Row Name 03/27/23 3779       General Information    Admission Type inpatient    Arrived From emergency department;home    Required Notices Provided Important Message from Medicare    Referral Source admission list    Reason for Consult discharge planning    Preferred Language English       Contact Information    Permission Granted to Share Info With                Functional Status     Row Name 03/27/23 1653       Functional Status    Usual Activity Tolerance good    Current Activity Tolerance good       Physical Activity    On  average, how many days per week do you engage in moderate to strenuous exercise (like a brisk walk)? 0 days    On average, how many minutes do you engage in exercise at this level? 0 min    Number of minutes of exercise per week 0       Functional Status, IADL    Medications independent    Meal Preparation independent    Housekeeping independent    Laundry independent    Shopping independent       Mental Status    General Appearance WDL WDL       Mental Status Summary    Recent Changes in Mental Status/Cognitive Functioning no changes       Employment/    Employment Status employed full-time;, previous service    Current or Previous Occupation healthcare           Current or Previous  Service none              Met with patient in room wearing PPE: mask,     Maintain distance greater than six feet and spent less than fifteen minutes in the room.    Rach Flores RN     Office Phone: [phone]  Office Cell:     [phone]

## 2023-03-27 NOTE — PHARMACY RECOMMENDATION
"[facility]Care ([facility] Future COST Assessment:     /Nurse requesting test claim: [facility]    Pharmacy ran test claim for the following:     Drug Sig Covered/PA required Patient Copay per month   Brilinta (ticagrelor) 90 mg bid Covered without PA $ 42     Patient Insurance Type: Medicaid - this means they are NOT eligible for a monthly discount card in the future (see \"free month\" note below)    Deductible/Medicare Gap Issue? Unknown    Is patient signed up for M2B service? Yes    Is above drug(s) eligible for 1 month free through M2B service? Yes - free coupon is available   If eligible,  or [facility] pharmacy can provide coupon upon request.           For billing questions, reach out to [facility] at [phone]  For M2B questions, reach out to Retail Pharmacy at [phone]    Benny Chung, PharmD   3/27/2023 11:50 EDT  "

## 2023-03-27 NOTE — PROGRESS NOTES
Critical Care Progress Note   Finn Vyas : 1945 MRN:[id number] LOS:2     Principal Problem: ST elevation myocardial infarction (STEMI), unspecified artery (HCC)     Reason for follow up: All the medical problems listed below    Summary     A 77 y.o. female admitted with a principal diagnosis of ST elevation myocardial infarction (STEMI), unspecified artery (HCC).      Significant events     23 : Sheath removed yesterday.  Pt remains on heparin gtt.      Assessment / Plan     STEMI (ST elevation myocardial infarction), involving RCA  -Underwent urgent cardiac catheterization in which patient had HONEY placed in RCA  -On losartan, metoprolol for HTN  -A1c wnl.  -Lipid panel pending.  On atorva 80 mg.  -On aspirin, Brillinta, heparin gtt  -Beta-blocker and ACE inhibitor to be prescribed prior to discharge if necessary, will defer to cardiology  -Lipid panel noted, already on statin therapy  -Cardiothoracic surgery consulted--no plans for surgery this admission  -Heparin gtt continues, per cardiology   -Off NTG gtt.  -Defer primary mgmt to cardiology.  -Transfer to PCU.  No indication for ICU at this time.           Code status:   Level Of Support Discussed With: Patient  Code Status (Patient has no pulse and is not breathing): CPR (Attempt to Resuscitate)  Medical Interventions (Patient has pulse or is breathing): Full Support  Release to patient: Routine Release       Nutrition: Diet: Cardiac Diets; Healthy Heart (2-3 Na+); Texture: Regular Texture (IDDSI 7); Fluid Consistency: Thin (IDDSI 0)   Patient isn't on Tube Feeding    DVT prophylaxis:   Mechanical Order History:     None      Pharmalogical Order History:      Ordered     Dose Route Frequency Stop    23 0534  heparin 98013 units/250 mL (100 units/mL) in 0.45 % NaCl infusion  7.48 mL/hr         12 Units/kg/hr IV Titrated --    23 0450  heparin 27751 units/250 mL (100 units/mL) in 0.45 % NaCl infusion  7.48 mL/hr,   Status:   Discontinued         12 Units/kg/hr IV Titrated 03/25/23 0534    03/25/23 0534  heparin bolus from bag 1,900 Units         30 Units/kg IV Every 6 Hours PRN --    03/25/23 0534  heparin bolus from bag 3,700 Units         60 Units/kg IV Every 6 Hours PRN --    03/25/23 0450  heparin bolus from bag 1,900 Units  Status:  Discontinued         30 Units/kg IV Every 6 Hours PRN 03/25/23 0534    03/25/23 0451  heparin bolus from bag 3,700 Units  Status:  Discontinued         60 Units/kg IV Every 6 Hours PRN 03/25/23 0534    03/25/23 0309  heparin (porcine) injection  Status:  Discontinued         -- -- Code / Trauma / Sedation Medication 03/25/23 0436    03/25/23 0153  heparin (porcine) 5000 UNIT/ML injection         -- IV Code / Trauma / Sedation Medication 03/25/23 0153    03/25/23 0150  heparin (porcine) 1000 UNIT/ML injection  - ADS Override Pull        Note to Pharmacy: Created by cabinet override    -- -- -- 03/25/23 1351                 Subjective / Review of systems     Review of Systems   Pt denies SOB or CP currently.  Denies f/c/s. No H/A.  Had some nausea with drinking water this am.  States she has had some diarrhea--states at home she will typically have 5 BM's/morning.    Objective / Physical Exam   Vital signs:  Temp: 97.3 °F (36.3 °C)  BP: 107/48  Heart Rate: 73  Resp: 14  SpO2: 95 %  Weight: 61.2 kg (135 lb)    Admission Weight: Weight: 62.4 kg (137 lb 9.6 oz)  Current Weight: Weight: 61.2 kg (135 lb)    Input/Output in last 24 hours:    Intake/Output Summary (Last 24 hours) at 3/27/2023 1138  Last data filed at 3/27/2023 0600  Gross per 24 hour   Intake 1061 ml   Output --   Net 1061 ml      Physical Exam   GEN:  Pleasant, thin, elderly woman.  Sitting up in bed on RA.  NAD.  NEURO:  Brainstem reflexes intact.  No obvious focal deficit.  Moves all 4 ext.  HEENT:  N/AT.  PERRL.  MMM.  Oropharynx non-erythematous.  No drainage from the eyes/ears/nose.  No conjunctival petechiae.  No oral thrush.  Auditory  and visual acuity grossly wnl.  Good dentition.  Voice normal.  NECK:  Supple, NT, trachea midline.  No meningismus.  No ROM limitation.  No torticollis.  No JVD.  No thyromegaly.    CHEST/LUNGS:  Breath sounds are clear, diminished, and equal bilaterally.  No w/r/r.  Chest excursion equal bilaterally.    CARDIOVASCULAR:  RRR w/o murmur noted.  GI:  Abdomen soft, NT, ND, +BS.  No HSM.  :  Deferred.  EXTREMITIES:  No deformity or amputation.  No cyanosis, edema, or asymmetry.  Pulses 2+ and equal in BLE's.    SKIN:  Warm, dry, and pink.  No rash, breakdown, or track marks noted.  LYMPHATICS/HEME:  No overt LAD or bruising. Cath site looks ok.  MSK:  Normal ROM.  No joint abnormalities noted.  Strength is 5/5 and equal in BUE and BLE's.  PSYCH:  Pleasant.  A&Ox 3.  Normal mood and affect.  Responds appropriately to commands and appears to comprehend instructions.      Radiology and Labs     Results from last 7 days   Lab Units 03/27/23  1048 03/26/23  0543 03/25/23  0605 03/25/23  0212   WBC 10*3/mm3 6.60 8.70 10.60 11.10*   HEMATOCRIT % 32.6* 33.9* 38.3 41.2   PLATELETS 10*3/mm3 118*  118* 158 191 184      Results from last 7 days   Lab Units 03/27/23  0312 03/26/23  0543 03/25/23  0154   SODIUM mmol/L 139 137 139   POTASSIUM mmol/L 3.9 4.0 3.7   CHLORIDE mmol/L 104 104 101   CO2 mmol/L 24.0 24.0 27.0   BUN mg/dL 12 11 18   CREATININE mg/dL 0.66 0.61 0.76      Current medications   Scheduled Meds: aspirin, 81 mg, Oral, Daily  atorvastatin, 80 mg, Oral, Nightly  metoprolol tartrate, 25 mg, Oral, Q12H  ticagrelor, 90 mg, Oral, BID      Continuous Infusions: heparin, 12 Units/kg/hr, Last Rate: 12 Units/kg/hr (03/27/23 1118)  nitroglycerin, 5-200 mcg/min, Last Rate: Stopped (03/26/23 0849)        Plan discussed with RN. Reviewed all other data in the last 24 hours, including but not limited to vitals, labs, microbiology, imaging and pertinent notes from other providers.     Oni Gomez,    Critical  Care  03/27/23   11:38 EDT

## 2023-03-27 NOTE — PROGRESS NOTES
Cardiology [place]        LOS:  LOS: 2 days   Patient Name: Finn Vyas  Age/Sex: 77 y.o. female  : 1945  MRN: [id number]    Day of Service: 23   Length of Stay: 2  Encounter Provider: PRECIOUS Aguayo  Place of Service: [facility] CARDIOLOGY  Patient Care Team:  Fermin Payton MD as PCP - General (Family Medicine)    Subjective:     Chief Complaint: f/u STEMI    Subjective: Resting comfortably, no acute events, chest pain has subsided  Remains on heparin drip    Okay to transfer out of ICU today  Troponin downtrending which is good  Remains with unstable lesion of proximal circumflex but needs to be staged between 2 and 4 weeks if remains stable  Status post overlapping stents to the RCA with complete revascularization of the system, LAD had nonobstructive angiographic disease but needs FFR    Transfer out of ICU  Continue heparin drip for today, transition to Eliquis 2.5 twice daily on top of aspirin and P2 Y12 inhibitor  Staged PCI potentially in 4 weeks  Hopefully out of the hospital next 48 hours    Current Medications:   Scheduled Meds:aspirin, 81 mg, Oral, Daily  atorvastatin, 80 mg, Oral, Nightly  metoprolol tartrate, 25 mg, Oral, Q12H  ticagrelor, 90 mg, Oral, BID      Continuous Infusions:heparin, 12 Units/kg/hr, Last Rate: 12 Units/kg/hr (23 1118)  nitroglycerin, 5-200 mcg/min, Last Rate: Stopped (23 0849)        Allergies:  No Known Allergies    Review of Systems   Constitutional: Negative for chills, diaphoresis and malaise/fatigue.   Cardiovascular: Negative for chest pain, dyspnea on exertion, irregular heartbeat, leg swelling, near-syncope, orthopnea, palpitations, paroxysmal nocturnal dyspnea and syncope.   Respiratory: Negative for cough, shortness of breath, sleep disturbances due to breathing and sputum production.    Gastrointestinal: Negative for change in bowel habit.   Genitourinary: Negative for urgency.   Neurological: Negative  for dizziness and headaches.   Psychiatric/Behavioral: Negative for altered mental status.         Objective:     Temp:  [97.3 °F (36.3 °C)-98.8 °F (37.1 °C)] 97.9 °F (36.6 °C)  Heart Rate:  [70-78] 73  Resp:  [14-20] 16  BP: ()/(48-79) 97/55     Intake/Output Summary (Last 24 hours) at 3/27/2023 1225  Last data filed at 3/27/2023 0800  Gross per 24 hour   Intake 1301 ml   Output --   Net 1301 ml     Body mass index is 29.21 kg/m².      03/25/23  0144 03/25/23  0451 03/26/23  0750   Weight: 62.4 kg (137 lb 9.6 oz) 61.6 kg (135 lb 12.9 oz) 61.2 kg (135 lb)         General Appearance:    Alert, cooperative, in no acute distress                                Head: Atraumatic, normocephalic, PERRLA               Neck:   supple,  no JVD   Lungs:     Clear to auscultation, respirations regular, even and               unlabored    Heart:    Regular rhythm and normal rate, normal S1 and S2   Abdomen:     Normal bowel sounds, no masses, no organomegaly, soft  nontender, nondistended, no guarding, no rebound  tenderness   Extremities:   Moves all extremities well, no edema, no cyanosis, no  redness   Pulses:   Pulses palpable and equal bilaterally   Skin:   No bleeding, bruising or rash   Neurologic:   Awake, alert, oriented x3   Unchanged from prior encounter      Lab Review:   Results from last 7 days   Lab Units 03/27/23  0312 03/26/23  0543   SODIUM mmol/L 139 137   POTASSIUM mmol/L 3.9 4.0   CHLORIDE mmol/L 104 104   CO2 mmol/L 24.0 24.0   BUN mg/dL 12 11   CREATININE mg/dL 0.66 0.61   GLUCOSE mg/dL 103* 94   CALCIUM mg/dL 8.9 8.9   AST (SGOT) U/L 31 47*   ALT (SGPT) U/L 19 21     Results from last 7 days   Lab Units 03/27/23  0312 03/26/23  0543 03/25/23  0154   HSTROP T ng/L 667* 1,005* 291*     Results from last 7 days   Lab Units 03/27/23  1048 03/26/23  0543   WBC 10*3/mm3 6.60 8.70   HEMOGLOBIN g/dL 10.9* 11.6*   HEMATOCRIT % 32.6* 33.9*   PLATELETS 10*3/mm3 118*  118* 158     Results from last 7 days   Lab  Units 03/27/23  1048 03/27/23  0312 03/25/23  1830 03/25/23  0154   INR   --   --   --  0.98   APTT seconds 66.0 48.4*   < > 23.6*    < > = values in this interval not displayed.     Results from last 7 days   Lab Units 03/27/23  0312 03/26/23  0543   MAGNESIUM mg/dL 1.9 1.8     Results from last 7 days   Lab Units 03/26/23  0543   CHOLESTEROL mg/dL 172   TRIGLYCERIDES mg/dL 130   HDL CHOL mg/dL 50               Recent Radiology:  Imaging Results (Most Recent)     Procedure Component Value Units Date/Time    XR Chest 1 View [[id number]] Collected: 03/25/23 0724     Updated: 03/25/23 0728    Narrative:      XR CHEST 1 VW    Date of Exam: 3/25/2023 2:22 AM EDT    Indication: cp.    Comparison: August 11, 2021    Findings:  The heart looks enlarged. The lungs seem relatively clear. There are no pleural effusions.      Impression:      Impression:  1.Cardiomegaly.    Electronically Signed: Paulo Pena    3/25/2023 7:26 AM EDT    Workstation ID: [id number]          ECHOCARDIOGRAM:    Results for orders placed during the hospital encounter of 03/25/23    Adult Transthoracic Echo Complete W/ Cont if Necessary Per Protocol    Interpretation Summary  •  Left ventricular systolic function is low normal. Left ventricular ejection fraction appears to be 51 - 55%.  •  Mildly reduced right ventricular systolic function noted.  •  Left atrial volume is mildly increased.  •  Moderate mitral valve regurgitation is present.  •  Estimated right ventricular systolic pressure from tricuspid regurgitation is normal (<35 mmHg).    Basal to mid inferior and inferolateral wall are hypokinetic, ischemic injury  Other segments contract normally  Overall EF appears 50%  Moderate MR eccentric, mild left atrial enlargement  Aortic valve is normal  IVC mildly enlarged with right atrial pressure estimated 10-15  No masses or effusions  RV mildly hypokinetic, normal size        I reviewed the patient's new clinical results.    EKG:      Assessment:        ST elevation myocardial infarction (STEMI), unspecified artery (HCC)    1. STEMI  - s/p [facility] 3/25/2023: Percutaneous coronary intervention to the RCA with overlapping 2.25 x 38 and 3.0 x 28 Xience drug-eluting stents postdilated 3.0 mm at 22 jossie with great angiographic results, 100% stenosis reduced to 0% residual  - troponin down trending   - 2D echo reviewed, basal to mid inferolateral wall appears severely hypokinetic, RV mildly hypokinetic after ischemic injury with RCA STEMI status post revascularization  - on heparin gtt, continue x another 24 hr.   - DAPT with ASA / Brilinta   - continue statin therapy    2. Thrombocytopenia- continue to monitor    3. Moderate MR, LVEF 50%    4. Borderline blood pressure, stop losartan    Plan:   Patient okay to transfer out of ICU  Continue heparin x another 24 hr, will plan for low dose Eliquis at discharge 2.5 twice daily  Continue DAPT ASA / Brilinta  Monitor plt  Chest pain free, troponin trending down, will plan for staged intervention in 30 days   Continue telemetry  Statin therapy on board  Optimize medicines as allowed by hemodynamics    Medicine changes today noted, multiple CV comorbidities, acute infarct, ACS, ischemic heart disease    Nico Carrasco MD, PhD    Jeannette De Anda, APRN  03/27/23  12:25 EDT

## 2023-03-28 LAB
ALBUMIN SERPL-MCNC: 2.9 G/DL (ref 3.5–5.2)
ALBUMIN/GLOB SERPL: 1.3 G/DL
ALP SERPL-CCNC: 71 U/L (ref 39–117)
ALT SERPL W P-5'-P-CCNC: 14 U/L (ref 1–33)
ANION GAP SERPL CALCULATED.3IONS-SCNC: 6 MMOL/L (ref 5–15)
APTT PPP: 49.8 SECONDS (ref 61–76.5)
APTT PPP: 55.7 SECONDS (ref 61–76.5)
APTT PPP: 59.9 SECONDS (ref 61–76.5)
APTT PPP: 69.6 SECONDS (ref 61–76.5)
APTT PPP: 71.5 SECONDS (ref 61–76.5)
AST SERPL-CCNC: 19 U/L (ref 1–32)
BILIRUB SERPL-MCNC: 0.5 MG/DL (ref 0–1.2)
BUN SERPL-MCNC: 10 MG/DL (ref 8–23)
BUN/CREAT SERPL: 15.6 (ref 7–25)
CALCIUM SPEC-SCNC: 7.9 MG/DL (ref 8.6–10.5)
CHLORIDE SERPL-SCNC: 106 MMOL/L (ref 98–107)
CO2 SERPL-SCNC: 24 MMOL/L (ref 22–29)
CREAT SERPL-MCNC: 0.64 MG/DL (ref 0.57–1)
DEPRECATED RDW RBC AUTO: 45.9 FL (ref 37–54)
EGFRCR SERPLBLD CKD-EPI 2021: 92.3 ML/MIN/1.73
ERYTHROCYTE [DISTWIDTH] IN BLOOD BY AUTOMATED COUNT: 13.8 % (ref 12.3–15.4)
GEN 5 2HR TROPONIN T REFLEX: 983 NG/L
GLOBULIN UR ELPH-MCNC: 2.3 GM/DL
GLUCOSE SERPL-MCNC: 100 MG/DL (ref 65–99)
HCT VFR BLD AUTO: 30 % (ref 34–46.6)
HGB BLD-MCNC: 9.9 G/DL (ref 12–15.9)
MAGNESIUM SERPL-MCNC: 2 MG/DL (ref 1.6–2.4)
MCH RBC QN AUTO: 30 PG (ref 26.6–33)
MCHC RBC AUTO-ENTMCNC: 32.9 G/DL (ref 31.5–35.7)
MCV RBC AUTO: 91.2 FL (ref 79–97)
PLATELET # BLD AUTO: 118 10*3/MM3 (ref 140–450)
PMV BLD AUTO: 8.8 FL (ref 6–12)
POTASSIUM SERPL-SCNC: 3.9 MMOL/L (ref 3.5–5.2)
PROT SERPL-MCNC: 5.2 G/DL (ref 6–8.5)
QT INTERVAL: 390 MS
RBC # BLD AUTO: 3.29 10*6/MM3 (ref 3.77–5.28)
SODIUM SERPL-SCNC: 136 MMOL/L (ref 136–145)
TROPONIN T DELTA: -7 NG/L
TROPONIN T SERPL HS-MCNC: 990 NG/L
WBC NRBC COR # BLD: 6.4 10*3/MM3 (ref 3.4–10.8)

## 2023-03-28 PROCEDURE — 83735 ASSAY OF MAGNESIUM: CPT | Performed by: INTERNAL MEDICINE

## 2023-03-28 PROCEDURE — 80053 COMPREHEN METABOLIC PANEL: CPT | Performed by: INTERNAL MEDICINE

## 2023-03-28 PROCEDURE — 85730 THROMBOPLASTIN TIME PARTIAL: CPT | Performed by: INTERNAL MEDICINE

## 2023-03-28 PROCEDURE — 99233 SBSQ HOSP IP/OBS HIGH 50: CPT | Performed by: INTERNAL MEDICINE

## 2023-03-28 PROCEDURE — 25010000002 HEPARIN (PORCINE) 25000-0.45 UT/250ML-% SOLUTION: Performed by: INTERNAL MEDICINE

## 2023-03-28 PROCEDURE — 85027 COMPLETE CBC AUTOMATED: CPT

## 2023-03-28 PROCEDURE — 84484 ASSAY OF TROPONIN QUANT: CPT | Performed by: INTERNAL MEDICINE

## 2023-03-28 RX ADMIN — TICAGRELOR 90 MG: 90 TABLET ORAL at 20:41

## 2023-03-28 RX ADMIN — ATORVASTATIN CALCIUM 80 MG: 40 TABLET, FILM COATED ORAL at 20:41

## 2023-03-28 RX ADMIN — HEPARIN SODIUM 16 UNITS/KG/HR: 10000 INJECTION, SOLUTION INTRAVENOUS at 23:15

## 2023-03-28 RX ADMIN — TICAGRELOR 90 MG: 90 TABLET ORAL at 11:09

## 2023-03-28 RX ADMIN — HEPARIN SODIUM 14 UNITS/KG/HR: 10000 INJECTION, SOLUTION INTRAVENOUS at 01:49

## 2023-03-28 RX ADMIN — METOPROLOL TARTRATE 25 MG: 25 TABLET, FILM COATED ORAL at 20:41

## 2023-03-28 RX ADMIN — ASPIRIN 81 MG CHEWABLE TABLET 81 MG: 81 TABLET CHEWABLE at 11:09

## 2023-03-28 NOTE — NURSING NOTE
RN notified that patient was nauseous. RN entered patient room to administer PRN nausea med. Patient reporting 10/10 crushing and burning chest, neck, jaw, and back pain. RN ordered STAT EKG and spoke with NP who ordered STAT troponin. RN called on-call cardiologist and received orders from Dr. Castro to give sublingual nitro and follow-up with troponin lab results.

## 2023-03-28 NOTE — NURSING NOTE
RN spoke with Dr. Castro at 6914, orders to give morphine once. Patient reported that pain subsided.

## 2023-03-28 NOTE — CASE MANAGEMENT/SOCIAL WORK
Continued Stay Note  [facility]     Patient Name: Finn Vyas  MRN: [id number]  Today's Date: 3/28/2023    Admit Date: 3/25/2023    Plan: DC Plan: Anticipate Routine Home alone. Brilinta will be affordable at $42 per month with first month free through meds to bed.   Discharge Plan     Row Name 03/28/23 1343       Plan    Plan DC Plan: Anticipate Routine Home alone. Brilinta will be affordable at $42 per month with first month free through meds to bed.    Provided Post Acute Provider List? N/A    Provided Post Acute Provider Quality & Resource List? N/A    Plan Comments D/C barriers: Heparin drip, CABG workup pending.                Expected Discharge Date and Time     Expected Discharge Date Expected Discharge Time    Mar 29, 2023         Phone communication or documentation only - no physical contact with patient or family.    Ana Stephens RN     [address] [address]  Phone: [phone]  Fax: [phone]

## 2023-03-28 NOTE — PROGRESS NOTES
Cardiology [place]        LOS:  LOS: 3 days   Patient Name: Finn Vyas  Age/Sex: 77 y.o. female  : 1945  MRN: [id number]    Day of Service: 23   Length of Stay: 3  Encounter Provider: PRECIOUS Aguayo  Place of Service: [facility] CARDIOLOGY  Patient Care Team:  Fermin Payton MD as PCP - General (Family Medicine)    Subjective:     Chief Complaint: f/u STEMI    Subjective: Patient developed chest pain overnight again, troponin slightly up 600 up to 900, pain into her jaw. Relieved by morphine and SL nitro. B/p remains borderline  Chest pain-free on encounter today at bedside    Remains with unstable lesion of proximal circumflex, previously unable to pass wire    Plan to continue to have heparin, antiplatelets on board  Repeat diagnostic imaging on Friday, FFR of the LAD and if significant recommend bypass surgery, reimaging ostial proximal circumflex etiology of continued chest pain    Status post overlapping stents to the RCA with revascularization of RCA system, LAD had borderline obstructive angiographic disease but needs FFR  Circumflex ostial proximal 95% atherosclerotic atherothrombotic lesion, wire with subintimal course even with soft BMW wire unable for balloon angioplasty and procedure was aborted at that time    Continue heparin drip for today, antiplatelets, plan for reimaging on Friday  If she becomes unstable will take back for balloon pump    Current Medications:   Scheduled Meds:aspirin, 81 mg, Oral, Daily  atorvastatin, 80 mg, Oral, Nightly  metoprolol tartrate, 25 mg, Oral, Q12H  ticagrelor, 90 mg, Oral, BID      Continuous Infusions:heparin, 12 Units/kg/hr, Last Rate: 14 Units/kg/hr (23 0800)        Allergies:  No Known Allergies    Review of Systems   Constitutional: Negative for chills, diaphoresis and malaise/fatigue.   Cardiovascular: Negative for chest pain, dyspnea on exertion, irregular heartbeat, leg swelling, near-syncope,  orthopnea, palpitations, paroxysmal nocturnal dyspnea and syncope.   Respiratory: Negative for cough, shortness of breath, sleep disturbances due to breathing and sputum production.    Gastrointestinal: Negative for change in bowel habit.   Genitourinary: Negative for urgency.   Neurological: Negative for dizziness and headaches.   Psychiatric/Behavioral: Negative for altered mental status.         Objective:     Temp:  [97.8 °F (36.6 °C)-98.7 °F (37.1 °C)] 98.3 °F (36.8 °C)  Heart Rate:  [67-90] 67  Resp:  [15-16] 15  BP: ()/(47-57) 92/50     Intake/Output Summary (Last 24 hours) at 3/28/2023 1105  Last data filed at 3/28/2023 0543  Gross per 24 hour   Intake 2229 ml   Output 400 ml   Net 1829 ml     Body mass index is 29.82 kg/m².      03/25/23  0451 03/26/23  0750 03/28/23  0000   Weight: 61.6 kg (135 lb 12.9 oz) 61.2 kg (135 lb) 62.5 kg (137 lb 12.6 oz)         General Appearance:    Alert, cooperative, in no acute distress                                Head: Atraumatic, normocephalic, PERRLA               Neck:   supple,  no JVD   Lungs:     Clear to auscultation, respirations regular, even and               unlabored    Heart:    Regular rhythm and normal rate, normal S1 and S2   Abdomen:     Normal bowel sounds, no masses, no organomegaly, soft  nontender, nondistended, no guarding, no rebound  tenderness   Extremities:   Moves all extremities well, no edema, no cyanosis, no  redness   Pulses:   Pulses palpable and equal bilaterally   Skin:   No bleeding, bruising or rash   Neurologic:   Awake, alert, oriented x3         Lab Review:   Results from last 7 days   Lab Units 03/28/23  0410 03/27/23  0312   SODIUM mmol/L 136 139   POTASSIUM mmol/L 3.9 3.9   CHLORIDE mmol/L 106 104   CO2 mmol/L 24.0 24.0   BUN mg/dL 10 12   CREATININE mg/dL 0.64 0.66   GLUCOSE mg/dL 100* 103*   CALCIUM mg/dL 7.9* 8.9   AST (SGOT) U/L 19 31   ALT (SGPT) U/L 14 19     Results from last 7 days   Lab Units 03/28/23  0619  03/28/23  0410 03/27/23  2129 03/27/23  0312 03/26/23  0543 03/25/23  0154   HSTROP T ng/L 983* 990* 767* 667* 1,005* 291*     Results from last 7 days   Lab Units 03/28/23  0410 03/27/23  1048   WBC 10*3/mm3 6.40 6.60   HEMOGLOBIN g/dL 9.9* 10.9*   HEMATOCRIT % 30.0* 32.6*   PLATELETS 10*3/mm3 118* 118*  118*     Results from last 7 days   Lab Units 03/28/23  0637 03/28/23  0410 03/25/23  1830 03/25/23  0154   INR   --   --   --  0.98   APTT seconds 59.9* 55.7*   < > 23.6*    < > = values in this interval not displayed.     Results from last 7 days   Lab Units 03/28/23  0410 03/27/23  0312   MAGNESIUM mg/dL 2.0 1.9     Results from last 7 days   Lab Units 03/26/23  0543   CHOLESTEROL mg/dL 172   TRIGLYCERIDES mg/dL 130   HDL CHOL mg/dL 50               Recent Radiology:  Imaging Results (Most Recent)     Procedure Component Value Units Date/Time    XR Chest 1 View [[id number]] Collected: 03/25/23 0724     Updated: 03/25/23 0728    Narrative:      XR CHEST 1 VW    Date of Exam: 3/25/2023 2:22 AM EDT    Indication: cp.    Comparison: August 11, 2021    Findings:  The heart looks enlarged. The lungs seem relatively clear. There are no pleural effusions.      Impression:      Impression:  1.Cardiomegaly.    Electronically Signed: Paulo Pena    3/25/2023 7:26 AM EDT    Workstation ID: [id number]          ECHOCARDIOGRAM:    Results for orders placed during the hospital encounter of 03/25/23    Adult Transthoracic Echo Complete W/ Cont if Necessary Per Protocol    Interpretation Summary  •  Left ventricular systolic function is low normal. Left ventricular ejection fraction appears to be 51 - 55%.  •  Mildly reduced right ventricular systolic function noted.  •  Left atrial volume is mildly increased.  •  Moderate mitral valve regurgitation is present.  •  Estimated right ventricular systolic pressure from tricuspid regurgitation is normal (<35 mmHg).    Basal to mid inferior and inferolateral wall are hypokinetic,  ischemic injury  Other segments contract normally  Overall EF appears 50%  Moderate MR eccentric, mild left atrial enlargement  Aortic valve is normal  IVC mildly enlarged with right atrial pressure estimated 10-15  No masses or effusions  RV mildly hypokinetic, normal size        I reviewed the patient's new clinical results.    EKG:      Assessment:       ST elevation myocardial infarction (STEMI), unspecified artery (HCC)    1. STEMI  - s/p [facility] 3/25/2023: Percutaneous coronary intervention to the RCA with overlapping 2.25 x 38 and 3.0 x 28 Xience drug-eluting stents postdilated 3.0 mm at 22 jossie with great angiographic results, 100% stenosis reduced to 0% residual  - troponin down trending   - 2D echo reviewed, basal to mid inferolateral wall appears severely hypokinetic, RV mildly hypokinetic after ischemic injury with RCA STEMI status post revascularization  - on heparin gtt, continue x another 24 hr.   - DAPT with ASA / Brilinta   - continue statin therapy    2. Thrombocytopenia- continue to monitor    3. Moderate MR, LVEF 50%    4. Borderline blood pressure, stop losartan    Plan:   Patient with chest pain and jaw pain overnight, relieved with morphine and SL nitro.   Plan for reimaging left system on Friday, IFR or FFR LAD  Reimaging ostial proximal circumflex  Wire was subintimal course even simply to try to pass a very soft workhorse wire BMW despite extreme care, procedure aborted initially  Troponin trended up but plateaued at 900 high-sensitivity assay  Chest pain free currently on encounter this morning    Continue heparin   Continue DAPT ASA / Brilinta  Monitor plt-- 118K today  Continue telemetry  Statin therapy on board      For refractory chest pain will take for balloon pump reimaging and possible bypass surgery    Nico Carrasco MD, PhD      Jeannette De Anda, APRN  03/28/23  11:05 EDT

## 2023-03-29 LAB
ALBUMIN SERPL-MCNC: 3.4 G/DL (ref 3.5–5.2)
ALBUMIN/GLOB SERPL: 1.3 G/DL
ALP SERPL-CCNC: 94 U/L (ref 39–117)
ALT SERPL W P-5'-P-CCNC: 19 U/L (ref 1–33)
ANION GAP SERPL CALCULATED.3IONS-SCNC: 8 MMOL/L (ref 5–15)
APTT PPP: 28.2 SECONDS (ref 61–76.5)
APTT PPP: 79.8 SECONDS (ref 61–76.5)
AST SERPL-CCNC: 28 U/L (ref 1–32)
BILIRUB SERPL-MCNC: 0.7 MG/DL (ref 0–1.2)
BUN SERPL-MCNC: 7 MG/DL (ref 8–23)
BUN/CREAT SERPL: 9.9 (ref 7–25)
CALCIUM SPEC-SCNC: 8.9 MG/DL (ref 8.6–10.5)
CHLORIDE SERPL-SCNC: 104 MMOL/L (ref 98–107)
CO2 SERPL-SCNC: 26 MMOL/L (ref 22–29)
CREAT SERPL-MCNC: 0.71 MG/DL (ref 0.57–1)
DEPRECATED RDW RBC AUTO: 45.9 FL (ref 37–54)
EGFRCR SERPLBLD CKD-EPI 2021: 88.8 ML/MIN/1.73
ERYTHROCYTE [DISTWIDTH] IN BLOOD BY AUTOMATED COUNT: 13.9 % (ref 12.3–15.4)
GLOBULIN UR ELPH-MCNC: 2.7 GM/DL
GLUCOSE SERPL-MCNC: 95 MG/DL (ref 65–99)
HCT VFR BLD AUTO: 31.4 % (ref 34–46.6)
HGB BLD-MCNC: 10.5 G/DL (ref 12–15.9)
MAGNESIUM SERPL-MCNC: 1.8 MG/DL (ref 1.6–2.4)
MCH RBC QN AUTO: 30 PG (ref 26.6–33)
MCHC RBC AUTO-ENTMCNC: 33.3 G/DL (ref 31.5–35.7)
MCV RBC AUTO: 90.1 FL (ref 79–97)
PLATELET # BLD AUTO: 143 10*3/MM3 (ref 140–450)
PMV BLD AUTO: 8.8 FL (ref 6–12)
POTASSIUM SERPL-SCNC: 3.8 MMOL/L (ref 3.5–5.2)
PROT SERPL-MCNC: 6.1 G/DL (ref 6–8.5)
RBC # BLD AUTO: 3.48 10*6/MM3 (ref 3.77–5.28)
SODIUM SERPL-SCNC: 138 MMOL/L (ref 136–145)
WBC NRBC COR # BLD: 6.9 10*3/MM3 (ref 3.4–10.8)
WHOLE BLOOD HOLD COAG: NORMAL

## 2023-03-29 PROCEDURE — 80053 COMPREHEN METABOLIC PANEL: CPT | Performed by: INTERNAL MEDICINE

## 2023-03-29 PROCEDURE — 85730 THROMBOPLASTIN TIME PARTIAL: CPT | Performed by: INTERNAL MEDICINE

## 2023-03-29 PROCEDURE — 25010000002 ONDANSETRON PER 1 MG: Performed by: INTERNAL MEDICINE

## 2023-03-29 PROCEDURE — 85027 COMPLETE CBC AUTOMATED: CPT

## 2023-03-29 PROCEDURE — 25010000002 HEPARIN (PORCINE) 25000-0.45 UT/250ML-% SOLUTION: Performed by: INTERNAL MEDICINE

## 2023-03-29 PROCEDURE — 83735 ASSAY OF MAGNESIUM: CPT | Performed by: INTERNAL MEDICINE

## 2023-03-29 PROCEDURE — 99233 SBSQ HOSP IP/OBS HIGH 50: CPT | Performed by: INTERNAL MEDICINE

## 2023-03-29 RX ORDER — HEPARIN SODIUM 10000 [USP'U]/100ML
12 INJECTION, SOLUTION INTRAVENOUS
Status: DISCONTINUED | OUTPATIENT
Start: 2023-03-29 | End: 2023-03-30

## 2023-03-29 RX ORDER — LOSARTAN POTASSIUM 25 MG/1
25 TABLET ORAL
Status: DISCONTINUED | OUTPATIENT
Start: 2023-03-29 | End: 2023-03-30

## 2023-03-29 RX ADMIN — LOPERAMIDE HYDROCHLORIDE 2 MG: 2 CAPSULE ORAL at 02:22

## 2023-03-29 RX ADMIN — TICAGRELOR 90 MG: 90 TABLET ORAL at 09:43

## 2023-03-29 RX ADMIN — ASPIRIN 81 MG CHEWABLE TABLET 81 MG: 81 TABLET CHEWABLE at 09:43

## 2023-03-29 RX ADMIN — PHENYLEPHRINE HYDROCHLORIDE 1 SPRAY: 0.5 SPRAY NASAL at 23:24

## 2023-03-29 RX ADMIN — HEPARIN SODIUM 15 UNITS/KG/HR: 10000 INJECTION, SOLUTION INTRAVENOUS at 17:20

## 2023-03-29 RX ADMIN — LOPERAMIDE HYDROCHLORIDE 2 MG: 2 CAPSULE ORAL at 09:43

## 2023-03-29 RX ADMIN — TICAGRELOR 90 MG: 90 TABLET ORAL at 21:11

## 2023-03-29 RX ADMIN — METOPROLOL TARTRATE 25 MG: 25 TABLET, FILM COATED ORAL at 09:43

## 2023-03-29 RX ADMIN — ONDANSETRON 4 MG: 2 INJECTION INTRAMUSCULAR; INTRAVENOUS at 09:43

## 2023-03-29 RX ADMIN — LOPERAMIDE HYDROCHLORIDE 2 MG: 2 CAPSULE ORAL at 21:11

## 2023-03-29 RX ADMIN — ATORVASTATIN CALCIUM 80 MG: 40 TABLET, FILM COATED ORAL at 21:11

## 2023-03-29 RX ADMIN — METOPROLOL TARTRATE 25 MG: 25 TABLET, FILM COATED ORAL at 21:11

## 2023-03-29 NOTE — PROGRESS NOTES
Critical Care Progress Note   Finn Vyas : 1945 MRN:[id number] LOS:4     Principal Problem: ST elevation myocardial infarction (STEMI), unspecified artery (HCC)     Reason for follow up: All the medical problems listed below    Summary     A 77 y.o. female admitted with a principal diagnosis of ST elevation myocardial infarction (STEMI), unspecified artery (HCC).      Significant events     23 : Remains on heparin gtt, hemodynamically stable on no other gtts.  Planned to go to cath lab on Friday.  Borderline blood pressures, losartan being held per cardiology.    Assessment / Plan     STEMI (ST elevation myocardial infarction) with unstable angina, involving RCA  Coronary artery disease  Hyperlipidemia  -Underwent urgent cardiac catheterization in which patient had HONEY placed in RCA  -On losartan, metoprolol for HTN  -A1c wnl.  -On aspirin, Brillinta, heparin gtt  -On BB, ARB being held due to borderline blood pressures  -Lipid panel noted, already on statin therapy  -Cardiothoracic surgery consulted--no plans for surgery this admission  -Off NTG gtt.  -Defer primary mgmt to cardiology.  -Discussed with cardiology, recommended continued management in ICU.  -Planned cardiac cath on Friday.    Moderate MR  -Cardiology following     Thrombocytopenia  -Continue to monitor and trend labs, improving    Code status:   Level Of Support Discussed With: Patient  Code Status (Patient has no pulse and is not breathing): CPR (Attempt to Resuscitate)  Medical Interventions (Patient has pulse or is breathing): Full Support  Release to patient: Routine Release       Nutrition: Diet: Cardiac Diets; Healthy Heart (2-3 Na+); Texture: Regular Texture (IDDSI 7); Fluid Consistency: Thin (IDDSI 0)   Patient isn't on Tube Feeding    DVT prophylaxis:   Mechanical Order History:     None      Pharmalogical Order History:      Ordered     Dose Route Frequency Stop    23 0534  heparin 42401 units/250 mL (100 units/mL)  in 0.45 % NaCl infusion  7.48 mL/hr         12 Units/kg/hr IV Titrated --    03/25/23 0450  heparin 98676 units/250 mL (100 units/mL) in 0.45 % NaCl infusion  7.48 mL/hr,   Status:  Discontinued         12 Units/kg/hr IV Titrated 03/25/23 0534    03/25/23 0534  heparin bolus from bag 1,900 Units         30 Units/kg IV Every 6 Hours PRN --    03/25/23 0534  heparin bolus from bag 3,700 Units         60 Units/kg IV Every 6 Hours PRN --    03/25/23 0450  heparin bolus from bag 1,900 Units  Status:  Discontinued         30 Units/kg IV Every 6 Hours PRN 03/25/23 0534    03/25/23 0451  heparin bolus from bag 3,700 Units  Status:  Discontinued         60 Units/kg IV Every 6 Hours PRN 03/25/23 0534    03/25/23 0309  heparin (porcine) injection  Status:  Discontinued         -- -- Code / Trauma / Sedation Medication 03/25/23 0436    03/25/23 0153  heparin (porcine) 5000 UNIT/ML injection         -- IV Code / Trauma / Sedation Medication 03/25/23 0153    03/25/23 0150  heparin (porcine) 1000 UNIT/ML injection  - ADS Override Pull        Note to Pharmacy: Created by cabinet override    -- -- -- 03/25/23 1351                 Subjective / Review of systems     Review of Systems   Constitutional: Negative for chills and fever.   Respiratory: Negative for cough and shortness of breath.    Cardiovascular: Negative for chest pain and palpitations.   Gastrointestinal: Negative for abdominal pain, nausea and vomiting.   Endocrine: Negative for cold intolerance and heat intolerance.   Genitourinary: Negative for dysuria and flank pain.   Neurological: Negative for dizziness, syncope and headaches.   Hematological: Negative for adenopathy. Does not bruise/bleed easily.   Psychiatric/Behavioral: Negative for confusion. The patient is not nervous/anxious.      Objective / Physical Exam   Vital signs:  Temp: 99 °F (37.2 °C)  BP: 107/62  Heart Rate: 80  Resp: 20  SpO2: 94 %  Weight: 60.5 kg (133 lb 6.4 oz)    Admission Weight: Weight: 62.4  kg (137 lb 9.6 oz)  Current Weight: Weight: 60.5 kg (133 lb 6.4 oz)    Input/Output in last 24 hours:    Intake/Output Summary (Last 24 hours) at 3/29/2023 1850  Last data filed at 3/29/2023 1536  Gross per 24 hour   Intake 2416 ml   Output 0 ml   Net 2416 ml      Physical Exam   GEN:  Pleasant, thin, elderly woman.  Sitting up in bed on RA.  NAD.  NEURO:  Brainstem reflexes intact.  No obvious focal deficit.  Moves all 4 ext.  HEENT:  N/AT.  PERRL.  MMM.  Oropharynx non-erythematous.  No drainage from the eyes/ears/nose.  No conjunctival petechiae.  No oral thrush.  Auditory and visual acuity grossly wnl.  Good dentition.  Voice normal.  NECK:  Supple, NT, trachea midline.  No meningismus.  No ROM limitation.  No torticollis.  No JVD.  No thyromegaly.    CHEST/LUNGS:  Breath sounds are clear, diminished, and equal bilaterally.  No w/r/r.  Chest excursion equal bilaterally.    CARDIOVASCULAR:  RRR w/o murmur noted.  GI:  Abdomen soft, NT, ND, +BS.  No HSM.  :  Deferred.  EXTREMITIES:  No deformity or amputation.  No cyanosis, edema, or asymmetry.  Pulses 2+ and equal in BLE's.    SKIN:  Warm, dry, and pink.  No rash, breakdown, or track marks noted.  LYMPHATICS/HEME:  No overt LAD or bruising. Cath site looks ok.  MSK:  Normal ROM.  No joint abnormalities noted.  Strength is 5/5 and equal in BUE and BLE's.  PSYCH:  Pleasant.  A&Ox 3.  Normal mood and affect.  Responds appropriately to commands and appears to comprehend instructions.      Radiology and Labs     Results from last 7 days   Lab Units 03/29/23  1630 03/28/23  0410 03/27/23  1048 03/26/23  0543 03/25/23  0605   WBC 10*3/mm3 6.90 6.40 6.60 8.70 10.60   HEMATOCRIT % 31.4* 30.0* 32.6* 33.9* 38.3   PLATELETS 10*3/mm3 143 118* 118*  118* 158 191      Results from last 7 days   Lab Units 03/29/23  0524 03/28/23  0410 03/27/23  0312 03/26/23  0543 03/25/23  0154   SODIUM mmol/L 138 136 139 137 139   POTASSIUM mmol/L 3.8 3.9 3.9 4.0 3.7   CHLORIDE mmol/L 104  106 104 104 101   CO2 mmol/L 26.0 24.0 24.0 24.0 27.0   BUN mg/dL 7* 10 12 11 18   CREATININE mg/dL 0.71 0.64 0.66 0.61 0.76      Current medications   Scheduled Meds: aspirin, 81 mg, Oral, Daily  atorvastatin, 80 mg, Oral, Nightly  heparin, 60 Units/kg, Intravenous, Once  losartan, 25 mg, Oral, Q24H  metoprolol tartrate, 25 mg, Oral, Q12H  ticagrelor, 90 mg, Oral, BID      Continuous Infusions: heparin, 12 Units/kg/hr, Last Rate: 15 Units/kg/hr (03/29/23 0644)        Plan discussed with RN. Reviewed all other data in the last 24 hours, including but not limited to vitals, labs, microbiology, imaging and pertinent notes from other providers.     PRECIOUS [place]   Critical Care  03/29/23   18:50 EDT

## 2023-03-29 NOTE — NURSING NOTE
The patient's hospitalist discontinued her heparin drip at 09:26 am, the cardiologist NP was notified per secure chat. She read the message but did not respond. When the cardiologist rounded around noon, he was notified about the discontinuation, but nothing was changed. Her PTT lab order was kept in order to monitor her status. At 1700, her PTT resulted at 28.2. The cardiologist NP was messaged again and this time responded saying she was not at work today. Her cardiologist was paged and he reordered her low dose heparin drip.

## 2023-03-29 NOTE — PROGRESS NOTES
Cardiology [place]        LOS:  LOS: 4 days   Patient Name: Finn Vyas  Age/Sex: 77 y.o. female  : 1945  MRN: [id number]    Day of Service: 23   Length of Stay: 4  Encounter Provider: Nico Carrasco MD  Place of Service: [facility] CARDIOLOGY  Patient Care Team:  Fermin Payton MD as PCP - General (Family Medicine)    Subjective:     Chief Complaint: f/u STEMI    Subjective: Troponin flat, some mild chest pain overnight but nothing sustained  Continuing plan ultimately for reimaging Friday, IFR LAD possible intervention proximal circumflex versus evaluation for bypass surgery  Chest pain-free on encounter today at bedside    Remains with unstable lesion of proximal circumflex, previously unable to pass wire    Plan to continue to have heparin, antiplatelets on board  Repeat diagnostic imaging on Friday, FFR of the LAD and if significant recommend bypass surgery, reimaging ostial proximal circumflex etiology of continued chest pain    Status post overlapping stents to the RCA with revascularization of RCA system, LAD had borderline obstructive angiographic disease but needs FFR  Circumflex ostial proximal 95% atherosclerotic atherothrombotic lesion, wire with subintimal course even with soft BMW wire unable for balloon angioplasty and procedure was aborted at that time    Continue heparin drip for today, antiplatelets, plan for reimaging on Friday  If she becomes unstable will take back for balloon pump    Current Medications:   Scheduled Meds:aspirin, 81 mg, Oral, Daily  atorvastatin, 80 mg, Oral, Nightly  losartan, 25 mg, Oral, Q24H  metoprolol tartrate, 25 mg, Oral, Q12H  ticagrelor, 90 mg, Oral, BID      Continuous Infusions:     Allergies:  No Known Allergies    Review of Systems   Constitutional: Negative for chills, diaphoresis and malaise/fatigue.   Cardiovascular: Negative for chest pain, dyspnea on exertion, irregular heartbeat, leg swelling,  near-syncope, orthopnea, palpitations, paroxysmal nocturnal dyspnea and syncope.   Respiratory: Negative for cough, shortness of breath, sleep disturbances due to breathing and sputum production.    Gastrointestinal: Negative for change in bowel habit.   Genitourinary: Negative for urgency.   Neurological: Negative for dizziness and headaches.   Psychiatric/Behavioral: Negative for altered mental status.         Objective:     Temp:  [98.3 °F (36.8 °C)-98.6 °F (37 °C)] 98.6 °F (37 °C)  Heart Rate:  [66-83] 78  Resp:  [14-16] 16  BP: ()/(36-76) 110/62     Intake/Output Summary (Last 24 hours) at 3/29/2023 1023  Last data filed at 3/29/2023 0600  Gross per 24 hour   Intake 2181 ml   Output 0 ml   Net 2181 ml     Body mass index is 28.87 kg/m².      03/26/23  0750 03/28/23  0000 03/29/23  0320   Weight: 61.2 kg (135 lb) 62.5 kg (137 lb 12.6 oz) 60.5 kg (133 lb 6.4 oz)         General Appearance:    Alert, cooperative, in no acute distress                                Head: Atraumatic, normocephalic, PERRLA               Neck:   supple,  no JVD   Lungs:     Clear to auscultation, respirations regular, even and               unlabored    Heart:    Regular rhythm and normal rate, normal S1 and S2   Abdomen:     Normal bowel sounds, no masses, no organomegaly, soft  nontender, nondistended, no guarding, no rebound  tenderness   Extremities:   Moves all extremities well, no edema, no cyanosis, no  redness   Pulses:   Pulses palpable and equal bilaterally   Skin:   No bleeding, bruising or rash   Neurologic:   Awake, alert, oriented x3     Unchanged from prior encounter    Lab Review:   Results from last 7 days   Lab Units 03/29/23  0524 03/28/23  0410   SODIUM mmol/L 138 136   POTASSIUM mmol/L 3.8 3.9   CHLORIDE mmol/L 104 106   CO2 mmol/L 26.0 24.0   BUN mg/dL 7* 10   CREATININE mg/dL 0.71 0.64   GLUCOSE mg/dL 95 100*   CALCIUM mg/dL 8.9 7.9*   AST (SGOT) U/L 28 19   ALT (SGPT) U/L 19 14     Results from last 7  days   Lab Units 03/28/23  0637 03/28/23  0410 03/27/23  2129 03/27/23  0312 03/26/23  0543 03/25/23  0154   HSTROP T ng/L 983* 990* 767* 667* 1,005* 291*     Results from last 7 days   Lab Units 03/28/23  0410 03/27/23  1048   WBC 10*3/mm3 6.40 6.60   HEMOGLOBIN g/dL 9.9* 10.9*   HEMATOCRIT % 30.0* 32.6*   PLATELETS 10*3/mm3 118* 118*  118*     Results from last 7 days   Lab Units 03/29/23  0524 03/28/23  2108 03/25/23  1830 03/25/23  0154   INR   --   --   --  0.98   APTT seconds 79.8* 49.8*   < > 23.6*    < > = values in this interval not displayed.     Results from last 7 days   Lab Units 03/29/23  0524 03/28/23  0410   MAGNESIUM mg/dL 1.8 2.0     Results from last 7 days   Lab Units 03/26/23  0543   CHOLESTEROL mg/dL 172   TRIGLYCERIDES mg/dL 130   HDL CHOL mg/dL 50               Recent Radiology:  Imaging Results (Most Recent)     Procedure Component Value Units Date/Time    XR Chest 1 View [[id number]] Collected: 03/25/23 0724     Updated: 03/25/23 0728    Narrative:      XR CHEST 1 VW    Date of Exam: 3/25/2023 2:22 AM EDT    Indication: cp.    Comparison: August 11, 2021    Findings:  The heart looks enlarged. The lungs seem relatively clear. There are no pleural effusions.      Impression:      Impression:  1.Cardiomegaly.    Electronically Signed: Paulo Pena    3/25/2023 7:26 AM EDT    Workstation ID: [id number]          ECHOCARDIOGRAM:    Results for orders placed during the hospital encounter of 03/25/23    Adult Transthoracic Echo Complete W/ Cont if Necessary Per Protocol    Interpretation Summary  •  Left ventricular systolic function is low normal. Left ventricular ejection fraction appears to be 51 - 55%.  •  Mildly reduced right ventricular systolic function noted.  •  Left atrial volume is mildly increased.  •  Moderate mitral valve regurgitation is present.  •  Estimated right ventricular systolic pressure from tricuspid regurgitation is normal (<35 mmHg).    Basal to mid inferior and  inferolateral wall are hypokinetic, ischemic injury  Other segments contract normally  Overall EF appears 50%  Moderate MR eccentric, mild left atrial enlargement  Aortic valve is normal  IVC mildly enlarged with right atrial pressure estimated 10-15  No masses or effusions  RV mildly hypokinetic, normal size        I reviewed the patient's new clinical results.    EKG:      Assessment:       ST elevation myocardial infarction (STEMI), unspecified artery (HCC)    1. STEMI  - s/p [facility] 3/25/2023: Percutaneous coronary intervention to the RCA with overlapping 2.25 x 38 and 3.0 x 28 Xience drug-eluting stents postdilated 3.0 mm at 22 jossie with great angiographic results, 100% stenosis reduced to 0% residual  - troponin down trending   - 2D echo reviewed, basal to mid inferolateral wall appears severely hypokinetic, RV mildly hypokinetic after ischemic injury with RCA STEMI status post revascularization  - on heparin gtt, continue x another 24 hr.   - DAPT with ASA / Brilinta   - continue statin therapy    2. Thrombocytopenia- continue to monitor    3. Moderate MR, LVEF 50%    4. Borderline blood pressure, stop losartan    Plan:   Intermittent use of morphine and nitroglycerin for chest pain  Plan for reimaging left system on Friday, IFR or FFR LAD  Reimaging ostial proximal circumflex  Wire was subintimal course even simply to try to pass a very soft workhorse wire BMW despite extreme care, procedure aborted initially  Troponin trended up but plateaued at 900 high-sensitivity assay  Chest pain free currently on encounter this morning    Continue heparin   Continue DAPT ASA / Brilinta  Monitor plt-- 118K today  Continue telemetry  Statin therapy on board    Follow platelets closely    For refractory chest pain will take for balloon pump reimaging and possible bypass surgery    Nico Carrasco MD, PhD      Nico Carrasco MD  03/29/23  10:23 EDT

## 2023-03-30 LAB
ALBUMIN SERPL-MCNC: 3.3 G/DL (ref 3.5–5.2)
ALBUMIN/GLOB SERPL: 1.3 G/DL
ALP SERPL-CCNC: 85 U/L (ref 39–117)
ALT SERPL W P-5'-P-CCNC: 20 U/L (ref 1–33)
ANION GAP SERPL CALCULATED.3IONS-SCNC: 11 MMOL/L (ref 5–15)
APTT PPP: 123.1 SECONDS (ref 61–76.5)
APTT PPP: 36 SECONDS (ref 61–76.5)
APTT PPP: 39.7 SECONDS (ref 61–76.5)
AST SERPL-CCNC: 31 U/L (ref 1–32)
BASOPHILS # BLD AUTO: 0.1 10*3/MM3 (ref 0–0.2)
BASOPHILS NFR BLD AUTO: 1 % (ref 0–1.5)
BILIRUB SERPL-MCNC: 0.5 MG/DL (ref 0–1.2)
BUN SERPL-MCNC: 9 MG/DL (ref 8–23)
BUN/CREAT SERPL: 11.7 (ref 7–25)
CALCIUM SPEC-SCNC: 8.8 MG/DL (ref 8.6–10.5)
CHLORIDE SERPL-SCNC: 102 MMOL/L (ref 98–107)
CLUMPED PLATELETS: NORMAL
CO2 SERPL-SCNC: 23 MMOL/L (ref 22–29)
CREAT SERPL-MCNC: 0.77 MG/DL (ref 0.57–1)
D DIMER PPP FEU-MCNC: 0.81 MG/L (FEU) (ref 0–0.75)
DEPRECATED RDW RBC AUTO: 43.8 FL (ref 37–54)
DEPRECATED RDW RBC AUTO: 43.8 FL (ref 37–54)
EGFRCR SERPLBLD CKD-EPI 2021: 80.6 ML/MIN/1.73
EOSINOPHIL # BLD AUTO: 0.3 10*3/MM3 (ref 0–0.4)
EOSINOPHIL NFR BLD AUTO: 3.2 % (ref 0.3–6.2)
ERYTHROCYTE [DISTWIDTH] IN BLOOD BY AUTOMATED COUNT: 13.6 % (ref 12.3–15.4)
ERYTHROCYTE [DISTWIDTH] IN BLOOD BY AUTOMATED COUNT: 13.7 % (ref 12.3–15.4)
FIBRINOGEN PPP-MCNC: 531 MG/DL (ref 210–450)
FIBRINOGEN PPP-MCNC: 531 MG/DL (ref 210–450)
GLOBULIN UR ELPH-MCNC: 2.6 GM/DL
GLUCOSE SERPL-MCNC: 94 MG/DL (ref 65–99)
HCT VFR BLD AUTO: 31.7 % (ref 34–46.6)
HCT VFR BLD AUTO: 32.9 % (ref 34–46.6)
HGB BLD-MCNC: 10.3 G/DL (ref 12–15.9)
HGB BLD-MCNC: 10.6 G/DL (ref 12–15.9)
INR PPP: 1.17 (ref 0.93–1.1)
LAB AP CASE REPORT: NORMAL
LYMPHOCYTES # BLD AUTO: 1.9 10*3/MM3 (ref 0.7–3.1)
LYMPHOCYTES NFR BLD AUTO: 22.2 % (ref 19.6–45.3)
MAGNESIUM SERPL-MCNC: 1.7 MG/DL (ref 1.6–2.4)
MCH RBC QN AUTO: 29.7 PG (ref 26.6–33)
MCH RBC QN AUTO: 29.8 PG (ref 26.6–33)
MCHC RBC AUTO-ENTMCNC: 32.3 G/DL (ref 31.5–35.7)
MCHC RBC AUTO-ENTMCNC: 32.3 G/DL (ref 31.5–35.7)
MCV RBC AUTO: 91.7 FL (ref 79–97)
MCV RBC AUTO: 92.3 FL (ref 79–97)
MONOCYTES # BLD AUTO: 0.6 10*3/MM3 (ref 0.1–0.9)
MONOCYTES NFR BLD AUTO: 7.3 % (ref 5–12)
NEUTROPHILS NFR BLD AUTO: 5.5 10*3/MM3 (ref 1.7–7)
NEUTROPHILS NFR BLD AUTO: 66.3 % (ref 42.7–76)
NRBC BLD AUTO-RTO: 0.1 /100 WBC (ref 0–0.2)
PATH REPORT.FINAL DX SPEC: NORMAL
PATHOLOGY REVIEW: YES
PLATELET # BLD AUTO: 129 10*3/MM3 (ref 140–450)
PLATELET # BLD AUTO: 158 10*3/MM3 (ref 140–450)
PMV BLD AUTO: 8.2 FL (ref 6–12)
PMV BLD AUTO: 9.5 FL (ref 6–12)
POTASSIUM SERPL-SCNC: 3.7 MMOL/L (ref 3.5–5.2)
PROT SERPL-MCNC: 5.9 G/DL (ref 6–8.5)
PROTHROMBIN TIME: 11.9 SECONDS (ref 9.6–11.7)
RBC # BLD AUTO: 3.46 10*6/MM3 (ref 3.77–5.28)
RBC # BLD AUTO: 3.57 10*6/MM3 (ref 3.77–5.28)
RBC MORPH BLD: NORMAL
SMALL PLATELETS BLD QL SMEAR: NORMAL
SODIUM SERPL-SCNC: 136 MMOL/L (ref 136–145)
WBC MORPH BLD: NORMAL
WBC NRBC COR # BLD: 8.1 10*3/MM3 (ref 3.4–10.8)
WBC NRBC COR # BLD: 8.3 10*3/MM3 (ref 3.4–10.8)

## 2023-03-30 PROCEDURE — 85379 FIBRIN DEGRADATION QUANT: CPT | Performed by: NURSE PRACTITIONER

## 2023-03-30 PROCEDURE — B2151ZZ FLUOROSCOPY OF LEFT HEART USING LOW OSMOLAR CONTRAST: ICD-10-PCS | Performed by: INTERNAL MEDICINE

## 2023-03-30 PROCEDURE — 25010000002 MIDAZOLAM PER 1 MG: Performed by: INTERNAL MEDICINE

## 2023-03-30 PROCEDURE — C1894 INTRO/SHEATH, NON-LASER: HCPCS | Performed by: INTERNAL MEDICINE

## 2023-03-30 PROCEDURE — 25010000002 FENTANYL CITRATE (PF) 100 MCG/2ML SOLUTION: Performed by: INTERNAL MEDICINE

## 2023-03-30 PROCEDURE — 86022 PLATELET ANTIBODIES: CPT | Performed by: NURSE PRACTITIONER

## 2023-03-30 PROCEDURE — 85007 BL SMEAR W/DIFF WBC COUNT: CPT | Performed by: NURSE PRACTITIONER

## 2023-03-30 PROCEDURE — 93454 CORONARY ARTERY ANGIO S&I: CPT | Performed by: INTERNAL MEDICINE

## 2023-03-30 PROCEDURE — 0 MAGNESIUM SULFATE 4 GM/100ML SOLUTION

## 2023-03-30 PROCEDURE — 85610 PROTHROMBIN TIME: CPT | Performed by: NURSE PRACTITIONER

## 2023-03-30 PROCEDURE — 99152 MOD SED SAME PHYS/QHP 5/>YRS: CPT | Performed by: INTERNAL MEDICINE

## 2023-03-30 PROCEDURE — C1769 GUIDE WIRE: HCPCS | Performed by: INTERNAL MEDICINE

## 2023-03-30 PROCEDURE — 85730 THROMBOPLASTIN TIME PARTIAL: CPT | Performed by: INTERNAL MEDICINE

## 2023-03-30 PROCEDURE — 85730 THROMBOPLASTIN TIME PARTIAL: CPT | Performed by: NURSE PRACTITIONER

## 2023-03-30 PROCEDURE — B2111ZZ FLUOROSCOPY OF MULTIPLE CORONARY ARTERIES USING LOW OSMOLAR CONTRAST: ICD-10-PCS | Performed by: INTERNAL MEDICINE

## 2023-03-30 PROCEDURE — 99153 MOD SED SAME PHYS/QHP EA: CPT | Performed by: INTERNAL MEDICINE

## 2023-03-30 PROCEDURE — 85362 FIBRIN DEGRADATION PRODUCTS: CPT | Performed by: NURSE PRACTITIONER

## 2023-03-30 PROCEDURE — 25510000001 IOPAMIDOL PER 1 ML: Performed by: INTERNAL MEDICINE

## 2023-03-30 PROCEDURE — 25010000002 HEPARIN (PORCINE) 25000-0.45 UT/250ML-% SOLUTION: Performed by: INTERNAL MEDICINE

## 2023-03-30 PROCEDURE — 85027 COMPLETE CBC AUTOMATED: CPT

## 2023-03-30 PROCEDURE — C1760 CLOSURE DEV, VASC: HCPCS | Performed by: INTERNAL MEDICINE

## 2023-03-30 PROCEDURE — 93458 L HRT ARTERY/VENTRICLE ANGIO: CPT | Performed by: INTERNAL MEDICINE

## 2023-03-30 PROCEDURE — 85384 FIBRINOGEN ACTIVITY: CPT | Performed by: NURSE PRACTITIONER

## 2023-03-30 PROCEDURE — 80053 COMPREHEN METABOLIC PANEL: CPT | Performed by: INTERNAL MEDICINE

## 2023-03-30 PROCEDURE — 4A023N7 MEASUREMENT OF CARDIAC SAMPLING AND PRESSURE, LEFT HEART, PERCUTANEOUS APPROACH: ICD-10-PCS | Performed by: INTERNAL MEDICINE

## 2023-03-30 PROCEDURE — 99233 SBSQ HOSP IP/OBS HIGH 50: CPT | Performed by: INTERNAL MEDICINE

## 2023-03-30 PROCEDURE — 83735 ASSAY OF MAGNESIUM: CPT | Performed by: INTERNAL MEDICINE

## 2023-03-30 PROCEDURE — C1887 CATHETER, GUIDING: HCPCS | Performed by: INTERNAL MEDICINE

## 2023-03-30 PROCEDURE — 85025 COMPLETE CBC W/AUTO DIFF WBC: CPT | Performed by: NURSE PRACTITIONER

## 2023-03-30 RX ORDER — FENTANYL CITRATE 50 UG/ML
INJECTION, SOLUTION INTRAMUSCULAR; INTRAVENOUS
Status: DISCONTINUED | OUTPATIENT
Start: 2023-03-30 | End: 2023-03-30 | Stop reason: HOSPADM

## 2023-03-30 RX ORDER — LOSARTAN POTASSIUM 25 MG/1
12.5 TABLET ORAL
Status: DISCONTINUED | OUTPATIENT
Start: 2023-03-31 | End: 2023-04-01 | Stop reason: HOSPADM

## 2023-03-30 RX ORDER — MIDAZOLAM HYDROCHLORIDE 1 MG/ML
INJECTION INTRAMUSCULAR; INTRAVENOUS
Status: DISCONTINUED | OUTPATIENT
Start: 2023-03-30 | End: 2023-03-30 | Stop reason: HOSPADM

## 2023-03-30 RX ORDER — SODIUM CHLORIDE 9 MG/ML
3 INJECTION, SOLUTION INTRAVENOUS CONTINUOUS
Status: DISPENSED | OUTPATIENT
Start: 2023-03-30 | End: 2023-03-30

## 2023-03-30 RX ORDER — ATORVASTATIN CALCIUM 80 MG/1
80 TABLET, FILM COATED ORAL NIGHTLY
Qty: 90 TABLET | Refills: 3 | Status: SHIPPED | OUTPATIENT
Start: 2023-03-30

## 2023-03-30 RX ORDER — LIDOCAINE HYDROCHLORIDE 20 MG/ML
INJECTION, SOLUTION INFILTRATION; PERINEURAL
Status: DISCONTINUED | OUTPATIENT
Start: 2023-03-30 | End: 2023-03-30 | Stop reason: HOSPADM

## 2023-03-30 RX ORDER — LOSARTAN POTASSIUM 25 MG/1
12.5 TABLET ORAL
Qty: 45 TABLET | Refills: 3 | Status: SHIPPED | OUTPATIENT
Start: 2023-03-31

## 2023-03-30 RX ORDER — NITROGLYCERIN 0.4 MG/1
0.4 TABLET SUBLINGUAL
Qty: 60 TABLET | Refills: 12 | Status: SHIPPED | OUTPATIENT
Start: 2023-03-30

## 2023-03-30 RX ORDER — ACETAMINOPHEN 325 MG/1
650 TABLET ORAL EVERY 4 HOURS PRN
Status: DISCONTINUED | OUTPATIENT
Start: 2023-03-30 | End: 2023-04-01 | Stop reason: HOSPADM

## 2023-03-30 RX ORDER — ASPIRIN 81 MG/1
81 TABLET, CHEWABLE ORAL DAILY
Qty: 90 TABLET | Refills: 3 | Status: SHIPPED | OUTPATIENT
Start: 2023-03-31

## 2023-03-30 RX ORDER — METOPROLOL SUCCINATE 50 MG/1
50 TABLET, EXTENDED RELEASE ORAL
Status: DISCONTINUED | OUTPATIENT
Start: 2023-03-31 | End: 2023-04-01 | Stop reason: HOSPADM

## 2023-03-30 RX ORDER — METOPROLOL SUCCINATE 50 MG/1
50 TABLET, EXTENDED RELEASE ORAL
Qty: 90 TABLET | Refills: 3 | Status: SHIPPED | OUTPATIENT
Start: 2023-03-31

## 2023-03-30 RX ADMIN — METOPROLOL TARTRATE 25 MG: 25 TABLET, FILM COATED ORAL at 19:44

## 2023-03-30 RX ADMIN — ASPIRIN 81 MG CHEWABLE TABLET 81 MG: 81 TABLET CHEWABLE at 08:54

## 2023-03-30 RX ADMIN — LOPERAMIDE HYDROCHLORIDE 2 MG: 2 CAPSULE ORAL at 03:49

## 2023-03-30 RX ADMIN — TICAGRELOR 90 MG: 90 TABLET ORAL at 08:54

## 2023-03-30 RX ADMIN — LOPERAMIDE HYDROCHLORIDE 2 MG: 2 CAPSULE ORAL at 19:47

## 2023-03-30 RX ADMIN — MAGNESIUM SULFATE HEPTAHYDRATE 4 G: 40 INJECTION, SOLUTION INTRAVENOUS at 08:54

## 2023-03-30 RX ADMIN — SODIUM CHLORIDE 3 ML/KG/HR: 9 INJECTION, SOLUTION INTRAVENOUS at 19:52

## 2023-03-30 RX ADMIN — PHENYLEPHRINE HYDROCHLORIDE 1 SPRAY: 0.5 SPRAY NASAL at 11:02

## 2023-03-30 RX ADMIN — HEPARIN SODIUM 12 UNITS/KG/HR: 10000 INJECTION, SOLUTION INTRAVENOUS at 04:54

## 2023-03-30 RX ADMIN — ATORVASTATIN CALCIUM 80 MG: 40 TABLET, FILM COATED ORAL at 20:17

## 2023-03-30 RX ADMIN — PHENYLEPHRINE HYDROCHLORIDE 1 SPRAY: 0.5 SPRAY NASAL at 03:49

## 2023-03-30 RX ADMIN — TICAGRELOR 90 MG: 90 TABLET ORAL at 20:17

## 2023-03-30 NOTE — PROGRESS NOTES
Cardiology [place]        LOS:  LOS: 5 days   Patient Name: Finn Vyas  Age/Sex: 77 y.o. female  : 1945  MRN: [id number]    Day of Service: 23   Length of Stay: 5  Encounter Provider: Nico Carrasco MD  Place of Service: [facility] CARDIOLOGY  Patient Care Team:  Fermin Payton MD as PCP - General (Family Medicine)    Subjective:     Chief Complaint: f/u STEMI    Subjective: Troponin flat,, subtle chest pain overnight but nothing as severe as previous, nosebleed noted overnight on heparin drip, held and restarted on ultimately HIT panel is pending, platelets have been low between 120 and 160 systolic but stable presently.  No arrhythmias, pending staged PCI with reimaging tomorrow, FFR of the LAD as well as possible intervention to the proximal circumflex.         Remains with unstable lesion of proximal circumflex, previously unable to pass wire    Plan to continue antiplatelets, hold heparin drip presently with epistaxis  Repeat diagnostic imaging on Friday, FFR of the LAD and if significant recommend bypass surgery, reimaging ostial proximal circumflex etiology of continued chest pain    Status post overlapping stents to the RCA with revascularization of RCA system, LAD had borderline obstructive angiographic disease but needs FFR  Circumflex ostial proximal 95% atherosclerotic atherothrombotic lesion, wire with subintimal course even with soft BMW wire unable for balloon angioplasty and procedure was aborted at that time    Current Medications:   Scheduled Meds:aspirin, 81 mg, Oral, Daily  atorvastatin, 80 mg, Oral, Nightly  heparin, 60 Units/kg, Intravenous, Once  losartan, 25 mg, Oral, Q24H  metoprolol tartrate, 25 mg, Oral, Q12H  ticagrelor, 90 mg, Oral, BID      Continuous Infusions:     Allergies:  No Known Allergies    Review of Systems   Constitutional: Negative for chills, diaphoresis and malaise/fatigue.   Cardiovascular: Negative for chest  pain, dyspnea on exertion, irregular heartbeat, leg swelling, near-syncope, orthopnea, palpitations, paroxysmal nocturnal dyspnea and syncope.   Respiratory: Negative for cough, shortness of breath, sleep disturbances due to breathing and sputum production.    Gastrointestinal: Negative for change in bowel habit.   Genitourinary: Negative for urgency.   Neurological: Negative for dizziness and headaches.   Psychiatric/Behavioral: Negative for altered mental status.         Objective:     Temp:  [97.7 °F (36.5 °C)-100.1 °F (37.8 °C)] 98 °F (36.7 °C)  Heart Rate:  [65-83] 70  Resp:  [16-20] 16  BP: ()/(37-69) 112/65     Intake/Output Summary (Last 24 hours) at 3/30/2023 1110  Last data filed at 3/30/2023 0847  Gross per 24 hour   Intake 2514 ml   Output 0 ml   Net 2514 ml     Body mass index is 28.87 kg/m².      03/26/23  0750 03/28/23  0000 03/29/23  0320   Weight: 61.2 kg (135 lb) 62.5 kg (137 lb 12.6 oz) 60.5 kg (133 lb 6.4 oz)         General Appearance:    Alert, cooperative, in no acute distress                                Head: Atraumatic, normocephalic, PERRLA, nosebleed               Neck:   supple,  no JVD   Lungs:     Clear to auscultation, respirations regular, even and               unlabored    Heart:    Regular rhythm and normal rate, normal S1 and S2   Abdomen:     Normal bowel sounds, no masses, no organomegaly, soft  nontender, nondistended, no guarding, no rebound  tenderness   Extremities:   Moves all extremities well, no edema, no cyanosis, no  redness   Pulses:   Pulses palpable and equal bilaterally   Skin:   No bleeding, bruising or rash   Neurologic:   Awake, alert, oriented x3    Packing with nosebleeding noted     Unchanged from prior encounter    Lab Review:   Results from last 7 days   Lab Units 03/30/23  0026 03/29/23  0524   SODIUM mmol/L 136 138   POTASSIUM mmol/L 3.7 3.8   CHLORIDE mmol/L 102 104   CO2 mmol/L 23.0 26.0   BUN mg/dL 9 7*   CREATININE mg/dL 0.77 0.71   GLUCOSE  mg/dL 94 95   CALCIUM mg/dL 8.8 8.9   AST (SGOT) U/L 31 28   ALT (SGPT) U/L 20 19     Results from last 7 days   Lab Units 03/28/23  0637 03/28/23  0410 03/27/23  2129 03/27/23  0312 03/26/23  0543 03/25/23  0154   HSTROP T ng/L 983* 990* 767* 667* 1,005* 291*     Results from last 7 days   Lab Units 03/30/23  0345 03/30/23  0026   WBC 10*3/mm3 8.30 8.10   HEMOGLOBIN g/dL 10.6* 10.3*   HEMATOCRIT % 32.9* 31.7*   PLATELETS 10*3/mm3 129* 158     Results from last 7 days   Lab Units 03/30/23  0345 03/30/23  0026 03/25/23  1830 03/25/23  0154   INR   --  1.17*  --  0.98   APTT seconds 36.0* 123.1*   < > 23.6*    < > = values in this interval not displayed.     Results from last 7 days   Lab Units 03/30/23  0026 03/29/23  0524   MAGNESIUM mg/dL 1.7 1.8     Results from last 7 days   Lab Units 03/26/23  0543   CHOLESTEROL mg/dL 172   TRIGLYCERIDES mg/dL 130   HDL CHOL mg/dL 50               Recent Radiology:  Imaging Results (Most Recent)     Procedure Component Value Units Date/Time    XR Chest 1 View [[id number]] Collected: 03/25/23 0724     Updated: 03/25/23 0728    Narrative:      XR CHEST 1 VW    Date of Exam: 3/25/2023 2:22 AM EDT    Indication: cp.    Comparison: August 11, 2021    Findings:  The heart looks enlarged. The lungs seem relatively clear. There are no pleural effusions.      Impression:      Impression:  1.Cardiomegaly.    Electronically Signed: Paulo Pena    3/25/2023 7:26 AM EDT    Workstation ID: [id number]          ECHOCARDIOGRAM:    Results for orders placed during the hospital encounter of 03/25/23    Adult Transthoracic Echo Complete W/ Cont if Necessary Per Protocol    Interpretation Summary  •  Left ventricular systolic function is low normal. Left ventricular ejection fraction appears to be 51 - 55%.  •  Mildly reduced right ventricular systolic function noted.  •  Left atrial volume is mildly increased.  •  Moderate mitral valve regurgitation is present.  •  Estimated right ventricular systolic  pressure from tricuspid regurgitation is normal (<35 mmHg).    Basal to mid inferior and inferolateral wall are hypokinetic, ischemic injury  Other segments contract normally  Overall EF appears 50%  Moderate MR eccentric, mild left atrial enlargement  Aortic valve is normal  IVC mildly enlarged with right atrial pressure estimated 10-15  No masses or effusions  RV mildly hypokinetic, normal size        I reviewed the patient's new clinical results.    EKG:      Assessment:       ST elevation myocardial infarction (STEMI), unspecified artery (HCC)    1. STEMI  - s/p [facility] 3/25/2023: Percutaneous coronary intervention to the RCA with overlapping 2.25 x 38 and 3.0 x 28 Xience drug-eluting stents postdilated 3.0 mm at 22 jossie with great angiographic results, 100% stenosis reduced to 0% residual  - troponin down trending   - 2D echo reviewed, basal to mid inferolateral wall appears severely hypokinetic, RV mildly hypokinetic after ischemic injury with RCA STEMI status post revascularization  - on heparin gtt, continue x another 24 hr.   - DAPT with ASA / Brilinta   - continue statin therapy    2. Thrombocytopenia- continue to monitor    3. Moderate MR, LVEF 50%    4. Borderline blood pressure, stop losartan    Plan:   Intermittent use of morphine and nitroglycerin for chest pain  Plan for reimaging left system on Friday, IFR or FFR LAD  Reimaging ostial proximal circumflex  Wire was subintimal course even simply to try to pass a very soft workhorse wire BMW despite extreme care, procedure aborted initially  Troponin trended up but plateaued at 900 high-sensitivity assay  Chest pain free currently on encounter this morning    Discontinue heparin  Continue DAPT ASA / Brilinta  Monitor plt--stable 1 20-1 30,000  Continue telemetry  Statin therapy on board    Staged PCI possible tomorrow, IFR LAD and if significant recommend bypass surgery, if not significant will consider attempts at circumflex, if unable to revascularize  circumflex may indicate revascularization is needed surgically    Follow platelets closely    For refractory chest pain will take for balloon pump reimaging and possible bypass surgery, circumflex represents large area of myocardium, ischemic burden    Nico Carrasco MD, PhD      Nico Carrasco MD  03/30/23  11:10 EDT

## 2023-03-30 NOTE — PLAN OF CARE
Problem: Adult Inpatient Plan of Care  Goal: Plan of Care Review  Outcome: Ongoing, Progressing  Flowsheets (Taken 3/30/2023 1034)  Progress: improving  Plan of Care Reviewed With: patient  Outcome Evaluation: no c/o pain, DC heparin drip per cardiologist. reported diarrhea to MD and no new order for it. Pt NPO currently and had heart cath today.

## 2023-03-30 NOTE — PLAN OF CARE
Left heart catheterization today    RCA widely patent, collateralized circumflex with subtotal occlusion of the midportion, can be staged for later date  Widely patent LAD with nonobstructive disease  Fully recovered EF 55 to 60% with normal filling pressures and normal transaortic valve gradient    Plan for discharge tomorrow on DAPT, beta-blockers for goal heart rate 60s to 70s, long-acting nitrates if needed  ACE inhibitor if allowed by blood pressure for afterload reduction otherwise has had full recovery of EF    We will follow her up in 30 days as outpatient  She may have some chronic stable angina with collateralization of large myocardial distribution from the RCA but otherwise is protected    Nico Carrasco MD, PhD

## 2023-03-30 NOTE — PROGRESS NOTES
Critical Care Progress Note   Finn Vyas : 1945 MRN:[id number] LOS:5     Principal Problem: ST elevation myocardial infarction (STEMI), unspecified artery (HCC)     Reason for follow up: All the medical problems listed below    Summary     A 77 y.o. female admitted with a principal diagnosis of ST elevation myocardial infarction (STEMI), unspecified artery (HCC).  Patient underwent PCTA to the RCA with a HONEY, as this was the culprit lesion.  But during cardiac catheterization, IFR was done on LAD as well as circumflex, and plan was initially for possible surgical bypass or possible staged PCI.  Patient was evaluated by CTS, and determined not to be a surgical candidate, therefore patient will proceed back for cardiac catheterization on 3/31 for staged PCI.    Significant events     23: Continues to have diarrhea, on heparin gtt but evaluated by cardiology and has been discontinued (3/30).  Planned cardiac cath on 3/31.  Cardiology wishes for patient to remain in the ICU until after procedure as patient is high risk for decompensation.    Assessment / Plan     STEMI (ST elevation myocardial infarction) with unstable angina, involving RCA  Coronary artery disease  Hyperlipidemia  -Underwent urgent cardiac catheterization in which patient had HONEY placed in RCA  -On losartan, metoprolol for HTN; losartan held on 3/29 due to borderline blood pressure, defer management to cardiology.  -A1c wnl.  -On aspirin, Brillinta, heparin gtt, heparin gtt discontinued by cardiology on 3/30.  -On BB, ARB being held due to borderline blood pressures  -Lipid panel noted, already on statin therapy  -Cardiothoracic surgery consulted--no plans for surgery this admission  -Off NTG gtt.  -Defer primary mgmt to cardiology.  -Discussed with cardiology, recommended continued management in ICU.  -Planned cardiac cath on Friday.    Moderate MR  -Cardiology following     Thrombocytopenia  -Continue to monitor and trend labs,  improving    Chronic diarrhea  -Not on home regimen, made all bowel regimen as needed.    Code status:   Level Of Support Discussed With: Patient  Code Status (Patient has no pulse and is not breathing): CPR (Attempt to Resuscitate)  Medical Interventions (Patient has pulse or is breathing): Full Support  Release to patient: Routine Release       Nutrition: Diet: Cardiac Diets; Healthy Heart (2-3 Na+); Texture: Regular Texture (IDDSI 7); Fluid Consistency: Thin (IDDSI 0)   Patient isn't on Tube Feeding    DVT prophylaxis:   Mechanical Order History:     None      Pharmalogical Order History:      Ordered     Dose Route Frequency Stop    03/25/23 0534  heparin 38471 units/250 mL (100 units/mL) in 0.45 % NaCl infusion  7.48 mL/hr         12 Units/kg/hr IV Titrated --    03/25/23 0450  heparin 86524 units/250 mL (100 units/mL) in 0.45 % NaCl infusion  7.48 mL/hr,   Status:  Discontinued         12 Units/kg/hr IV Titrated 03/25/23 0534    03/25/23 0534  heparin bolus from bag 1,900 Units         30 Units/kg IV Every 6 Hours PRN --    03/25/23 0534  heparin bolus from bag 3,700 Units         60 Units/kg IV Every 6 Hours PRN --    03/25/23 0450  heparin bolus from bag 1,900 Units  Status:  Discontinued         30 Units/kg IV Every 6 Hours PRN 03/25/23 0534    03/25/23 0451  heparin bolus from bag 3,700 Units  Status:  Discontinued         60 Units/kg IV Every 6 Hours PRN 03/25/23 0534    03/25/23 0309  heparin (porcine) injection  Status:  Discontinued         -- -- Code / Trauma / Sedation Medication 03/25/23 0436    03/25/23 0153  heparin (porcine) 5000 UNIT/ML injection         -- IV Code / Trauma / Sedation Medication 03/25/23 0153    03/25/23 0150  heparin (porcine) 1000 UNIT/ML injection  - ADS Override Pull        Note to Pharmacy: Created by cabinet override    -- -- -- 03/25/23 1806                 Subjective / Review of systems     Review of Systems   Constitutional: Negative for chills and fever.   Respiratory:  Negative for cough and shortness of breath.    Cardiovascular: Negative for chest pain and palpitations.   Gastrointestinal: Positive for diarrhea. Negative for abdominal distention, abdominal pain, nausea and vomiting.   Endocrine: Negative for cold intolerance and heat intolerance.   Genitourinary: Negative for dysuria and flank pain.   Musculoskeletal: Negative for back pain.   Neurological: Negative for dizziness, syncope and headaches.   Hematological: Negative for adenopathy. Does not bruise/bleed easily.   Psychiatric/Behavioral: Negative for confusion. The patient is not nervous/anxious.      Objective / Physical Exam     Vital signs:  Temp: 98 °F (36.7 °C)  BP: 112/65  Heart Rate: 70  Resp: 16  SpO2: 95 %  Weight: 60.5 kg (133 lb 6.4 oz)    Admission Weight: Weight: 62.4 kg (137 lb 9.6 oz)  Current Weight: Weight: 60.5 kg (133 lb 6.4 oz)    Input/Output in last 24 hours:    Intake/Output Summary (Last 24 hours) at 3/30/2023 0949  Last data filed at 3/30/2023 0847  Gross per 24 hour   Intake 2514 ml   Output 0 ml   Net 2514 ml      Physical Exam  Vitals and nursing note reviewed.   Constitutional:       General: She is not in acute distress.     Appearance: Normal appearance. She is not ill-appearing, toxic-appearing or diaphoretic.   HENT:      Head: Normocephalic and atraumatic.      Nose: Nose normal. No congestion.      Mouth/Throat:      Mouth: Mucous membranes are moist.      Pharynx: Oropharynx is clear. No oropharyngeal exudate.   Eyes:      General: No scleral icterus.     Extraocular Movements: Extraocular movements intact.      Conjunctiva/sclera: Conjunctivae normal.      Pupils: Pupils are equal, round, and reactive to light.   Cardiovascular:      Rate and Rhythm: Normal rate and regular rhythm.      Pulses: Normal pulses.      Heart sounds: Normal heart sounds. No murmur heard.    No gallop.   Pulmonary:      Breath sounds: Normal breath sounds. No wheezing or rales.   Abdominal:      General:  Bowel sounds are normal.      Palpations: Abdomen is soft.      Tenderness: There is no abdominal tenderness.   Musculoskeletal:         General: No tenderness. Normal range of motion.      Cervical back: Neck supple.      Right lower leg: No edema.      Left lower leg: No edema.   Skin:     General: Skin is warm and dry.      Findings: No rash.   Neurological:      General: No focal deficit present.      Mental Status: She is alert and oriented to person, place, and time.   Psychiatric:      Comments: Calm, cooperative.          Radiology and Labs     Results from last 7 days   Lab Units 03/30/23  0345 03/30/23  0026 03/29/23  1630 03/28/23  0410 03/27/23  1048   WBC 10*3/mm3 8.30 8.10 6.90 6.40 6.60   HEMATOCRIT % 32.9* 31.7* 31.4* 30.0* 32.6*   PLATELETS 10*3/mm3 129* 158 143 118* 118*  118*      Results from last 7 days   Lab Units 03/30/23  0026 03/29/23  0524 03/28/23  0410 03/27/23  0312 03/26/23  0543   SODIUM mmol/L 136 138 136 139 137   POTASSIUM mmol/L 3.7 3.8 3.9 3.9 4.0   CHLORIDE mmol/L 102 104 106 104 104   CO2 mmol/L 23.0 26.0 24.0 24.0 24.0   BUN mg/dL 9 7* 10 12 11   CREATININE mg/dL 0.77 0.71 0.64 0.66 0.61     No radiology results for the last day    Current medications   Scheduled Meds:   aspirin, 81 mg, Oral, Daily  atorvastatin, 80 mg, Oral, Nightly  heparin, 60 Units/kg, Intravenous, Once  losartan, 25 mg, Oral, Q24H  metoprolol tartrate, 25 mg, Oral, Q12H  ticagrelor, 90 mg, Oral, BID      Continuous Infusions:   heparin, 12 Units/kg/hr, Last Rate: 12 Units/kg/hr (03/30/23 0454)        Plan discussed with RN. Reviewed all other data in the last 24 hours, including but not limited to vitals, labs, microbiology, imaging and pertinent notes from other providers.     PRECIOUS [place]   Critical Care  03/30/23   09:49 EDT

## 2023-03-30 NOTE — NURSING NOTE
0330: Heparin gtt held per protocol for . Pt continues to have nose bleed from right nostril. Jeanie Suresh NP states to order another PTT before restarting heparin, along with fibrinogen and HIT panel. RN also reports continued watery stools with no abdominal discomfort, fever or other symptoms.    0500: Heparin gtt restarted, nose bleed subsided at this time. Jeanie Suresh NP notified of labs.

## 2023-03-31 ENCOUNTER — APPOINTMENT (OUTPATIENT)
Dept: GENERAL RADIOLOGY | Facility: HOSPITAL | Age: 78
DRG: 247 | End: 2023-03-31
Payer: MEDICARE

## 2023-03-31 ENCOUNTER — APPOINTMENT (OUTPATIENT)
Dept: CT IMAGING | Facility: HOSPITAL | Age: 78
DRG: 247 | End: 2023-03-31
Payer: MEDICARE

## 2023-03-31 LAB
ALBUMIN SERPL-MCNC: 3.3 G/DL (ref 3.5–5.2)
ALBUMIN/GLOB SERPL: 1.3 G/DL
ALP SERPL-CCNC: 93 U/L (ref 39–117)
ALT SERPL W P-5'-P-CCNC: 27 U/L (ref 1–33)
ANION GAP SERPL CALCULATED.3IONS-SCNC: 9 MMOL/L (ref 5–15)
APTT PPP: 29.2 SECONDS (ref 61–76.5)
ARTERIAL PATENCY WRIST A: POSITIVE
AST SERPL-CCNC: 31 U/L (ref 1–32)
ATMOSPHERIC PRESS: ABNORMAL MM[HG]
B PARAPERT DNA SPEC QL NAA+PROBE: NOT DETECTED
B PERT DNA SPEC QL NAA+PROBE: NOT DETECTED
BASE EXCESS BLDA CALC-SCNC: -0.8 MMOL/L (ref 0–3)
BASOPHILS # BLD AUTO: 0.1 10*3/MM3 (ref 0–0.2)
BASOPHILS NFR BLD AUTO: 0.6 % (ref 0–1.5)
BDY SITE: ABNORMAL
BILIRUB SERPL-MCNC: 0.5 MG/DL (ref 0–1.2)
BUN SERPL-MCNC: 7 MG/DL (ref 8–23)
BUN/CREAT SERPL: 9.6 (ref 7–25)
C PNEUM DNA NPH QL NAA+NON-PROBE: NOT DETECTED
CALCIUM SPEC-SCNC: 8.9 MG/DL (ref 8.6–10.5)
CHLORIDE SERPL-SCNC: 103 MMOL/L (ref 98–107)
CHOLEST SERPL-MCNC: 113 MG/DL (ref 0–200)
CO2 BLDA-SCNC: 25.3 MMOL/L (ref 22–29)
CO2 SERPL-SCNC: 24 MMOL/L (ref 22–29)
CREAT SERPL-MCNC: 0.73 MG/DL (ref 0.57–1)
DEPRECATED RDW RBC AUTO: 44.6 FL (ref 37–54)
EGFRCR SERPLBLD CKD-EPI 2021: 85.9 ML/MIN/1.73
EOSINOPHIL # BLD AUTO: 0.2 10*3/MM3 (ref 0–0.4)
EOSINOPHIL NFR BLD AUTO: 3 % (ref 0.3–6.2)
ERYTHROCYTE [DISTWIDTH] IN BLOOD BY AUTOMATED COUNT: 13.6 % (ref 12.3–15.4)
FLUAV SUBTYP SPEC NAA+PROBE: NOT DETECTED
FLUBV RNA ISLT QL NAA+PROBE: NOT DETECTED
FSP PPP-MCNC: <5 UG/ML
GEN 5 2HR TROPONIN T REFLEX: 1016 NG/L
GLOBULIN UR ELPH-MCNC: 2.6 GM/DL
GLUCOSE SERPL-MCNC: 98 MG/DL (ref 65–99)
HADV DNA SPEC NAA+PROBE: NOT DETECTED
HCO3 BLDA-SCNC: 24 MMOL/L (ref 21–28)
HCOV 229E RNA SPEC QL NAA+PROBE: NOT DETECTED
HCOV HKU1 RNA SPEC QL NAA+PROBE: NOT DETECTED
HCOV NL63 RNA SPEC QL NAA+PROBE: NOT DETECTED
HCOV OC43 RNA SPEC QL NAA+PROBE: NOT DETECTED
HCT VFR BLD AUTO: 31.5 % (ref 34–46.6)
HDLC SERPL-MCNC: 42 MG/DL (ref 40–60)
HEMODILUTION: NO
HGB BLD-MCNC: 10.6 G/DL (ref 12–15.9)
HMPV RNA NPH QL NAA+NON-PROBE: NOT DETECTED
HPIV1 RNA ISLT QL NAA+PROBE: NOT DETECTED
HPIV2 RNA SPEC QL NAA+PROBE: NOT DETECTED
HPIV3 RNA NPH QL NAA+PROBE: NOT DETECTED
HPIV4 P GENE NPH QL NAA+PROBE: NOT DETECTED
INHALED O2 CONCENTRATION: 28 %
LDLC SERPL CALC-MCNC: 53 MG/DL (ref 0–100)
LDLC/HDLC SERPL: 1.26 {RATIO}
LYMPHOCYTES # BLD AUTO: 1 10*3/MM3 (ref 0.7–3.1)
LYMPHOCYTES NFR BLD AUTO: 12 % (ref 19.6–45.3)
M PNEUMO IGG SER IA-ACNC: NOT DETECTED
MAGNESIUM SERPL-MCNC: 1.9 MG/DL (ref 1.6–2.4)
MCH RBC QN AUTO: 30.6 PG (ref 26.6–33)
MCHC RBC AUTO-ENTMCNC: 33.7 G/DL (ref 31.5–35.7)
MCV RBC AUTO: 90.6 FL (ref 79–97)
MODALITY: ABNORMAL
MONOCYTES # BLD AUTO: 0.9 10*3/MM3 (ref 0.1–0.9)
MONOCYTES NFR BLD AUTO: 10.6 % (ref 5–12)
NEUTROPHILS NFR BLD AUTO: 6 10*3/MM3 (ref 1.7–7)
NEUTROPHILS NFR BLD AUTO: 73.8 % (ref 42.7–76)
NRBC BLD AUTO-RTO: 0.1 /100 WBC (ref 0–0.2)
PCO2 BLDA: 39.7 MM HG (ref 35–48)
PF4 HEPARIN CMPLX IGG SERPL IA: 0.08 OD (ref 0–0.4)
PH BLDA: 7.39 PH UNITS (ref 7.35–7.45)
PLATELET # BLD AUTO: 154 10*3/MM3 (ref 140–450)
PMV BLD AUTO: 7.4 FL (ref 6–12)
PO2 BLDA: 81.5 MM HG (ref 83–108)
POTASSIUM SERPL-SCNC: 4.1 MMOL/L (ref 3.5–5.2)
PROT SERPL-MCNC: 5.9 G/DL (ref 6–8.5)
QT INTERVAL: 343 MS
RBC # BLD AUTO: 3.48 10*6/MM3 (ref 3.77–5.28)
RHINOVIRUS RNA SPEC NAA+PROBE: NOT DETECTED
RSV RNA NPH QL NAA+NON-PROBE: NOT DETECTED
SAO2 % BLDCOA: 95.9 % (ref 94–98)
SARS-COV-2 RNA NPH QL NAA+NON-PROBE: NOT DETECTED
SODIUM SERPL-SCNC: 136 MMOL/L (ref 136–145)
TRIGL SERPL-MCNC: 91 MG/DL (ref 0–150)
TROPONIN T DELTA: 5 NG/L
TROPONIN T SERPL HS-MCNC: 1011 NG/L
VLDLC SERPL-MCNC: 18 MG/DL (ref 5–40)
WBC NRBC COR # BLD: 8.1 10*3/MM3 (ref 3.4–10.8)

## 2023-03-31 PROCEDURE — 71045 X-RAY EXAM CHEST 1 VIEW: CPT

## 2023-03-31 PROCEDURE — 94799 UNLISTED PULMONARY SVC/PX: CPT

## 2023-03-31 PROCEDURE — 94640 AIRWAY INHALATION TREATMENT: CPT

## 2023-03-31 PROCEDURE — 80053 COMPREHEN METABOLIC PANEL: CPT | Performed by: INTERNAL MEDICINE

## 2023-03-31 PROCEDURE — 85730 THROMBOPLASTIN TIME PARTIAL: CPT | Performed by: INTERNAL MEDICINE

## 2023-03-31 PROCEDURE — 99233 SBSQ HOSP IP/OBS HIGH 50: CPT | Performed by: INTERNAL MEDICINE

## 2023-03-31 PROCEDURE — 94618 PULMONARY STRESS TESTING: CPT

## 2023-03-31 PROCEDURE — 80061 LIPID PANEL: CPT | Performed by: INTERNAL MEDICINE

## 2023-03-31 PROCEDURE — 84484 ASSAY OF TROPONIN QUANT: CPT | Performed by: NURSE PRACTITIONER

## 2023-03-31 PROCEDURE — 71250 CT THORAX DX C-: CPT

## 2023-03-31 PROCEDURE — 36600 WITHDRAWAL OF ARTERIAL BLOOD: CPT

## 2023-03-31 PROCEDURE — 85025 COMPLETE CBC W/AUTO DIFF WBC: CPT | Performed by: INTERNAL MEDICINE

## 2023-03-31 PROCEDURE — 0202U NFCT DS 22 TRGT SARS-COV-2: CPT | Performed by: INTERNAL MEDICINE

## 2023-03-31 PROCEDURE — 25010000002 METHYLPREDNISOLONE PER 125 MG: Performed by: INTERNAL MEDICINE

## 2023-03-31 PROCEDURE — 83735 ASSAY OF MAGNESIUM: CPT | Performed by: INTERNAL MEDICINE

## 2023-03-31 PROCEDURE — 82803 BLOOD GASES ANY COMBINATION: CPT

## 2023-03-31 PROCEDURE — 93005 ELECTROCARDIOGRAM TRACING: CPT | Performed by: NURSE PRACTITIONER

## 2023-03-31 PROCEDURE — 94761 N-INVAS EAR/PLS OXIMETRY MLT: CPT

## 2023-03-31 PROCEDURE — 93010 ELECTROCARDIOGRAM REPORT: CPT | Performed by: INTERNAL MEDICINE

## 2023-03-31 RX ORDER — PREDNISONE 1 MG/1
15 TABLET ORAL DAILY
Qty: 9 TABLET | Refills: 0 | Status: SHIPPED | OUTPATIENT
Start: 2023-04-03 | End: 2023-03-31

## 2023-03-31 RX ORDER — PREDNISONE 10 MG/1
10 TABLET ORAL DAILY
Status: DISCONTINUED | OUTPATIENT
Start: 2023-04-07 | End: 2023-04-01 | Stop reason: HOSPADM

## 2023-03-31 RX ORDER — PREDNISONE 1 MG/1
5 TABLET ORAL DAILY
Status: DISCONTINUED | OUTPATIENT
Start: 2023-04-10 | End: 2023-04-01 | Stop reason: HOSPADM

## 2023-03-31 RX ORDER — ALBUTEROL SULFATE 90 UG/1
2 AEROSOL, METERED RESPIRATORY (INHALATION)
Status: DISCONTINUED | OUTPATIENT
Start: 2023-03-31 | End: 2023-04-01 | Stop reason: HOSPADM

## 2023-03-31 RX ORDER — DOXYCYCLINE 100 MG/1
100 TABLET ORAL EVERY 12 HOURS SCHEDULED
Status: DISCONTINUED | OUTPATIENT
Start: 2023-03-31 | End: 2023-04-01 | Stop reason: HOSPADM

## 2023-03-31 RX ORDER — PREDNISONE 1 MG/1
5 TABLET ORAL DAILY
Qty: 3 TABLET | Refills: 0 | Status: SHIPPED | OUTPATIENT
Start: 2023-04-09 | End: 2023-03-31

## 2023-03-31 RX ORDER — DOXYCYCLINE 100 MG/1
100 TABLET ORAL EVERY 12 HOURS SCHEDULED
Qty: 20 TABLET | Refills: 0 | Status: SHIPPED | OUTPATIENT
Start: 2023-03-31 | End: 2023-04-10

## 2023-03-31 RX ORDER — PANTOPRAZOLE SODIUM 40 MG/1
40 TABLET, DELAYED RELEASE ORAL
Status: DISCONTINUED | OUTPATIENT
Start: 2023-03-31 | End: 2023-04-01 | Stop reason: HOSPADM

## 2023-03-31 RX ORDER — ALBUTEROL SULFATE 0.63 MG/3ML
0.63 SOLUTION RESPIRATORY (INHALATION) EVERY 6 HOURS PRN
Status: DISCONTINUED | OUTPATIENT
Start: 2023-03-31 | End: 2023-04-01 | Stop reason: HOSPADM

## 2023-03-31 RX ORDER — ALBUTEROL SULFATE 90 UG/1
2 AEROSOL, METERED RESPIRATORY (INHALATION)
Qty: 18 G | Refills: 3 | Status: SHIPPED | OUTPATIENT
Start: 2023-03-31

## 2023-03-31 RX ORDER — METHYLPREDNISOLONE SODIUM SUCCINATE 125 MG/2ML
125 INJECTION, POWDER, LYOPHILIZED, FOR SOLUTION INTRAMUSCULAR; INTRAVENOUS ONCE
Status: COMPLETED | OUTPATIENT
Start: 2023-03-31 | End: 2023-03-31

## 2023-03-31 RX ORDER — PREDNISONE 20 MG/1
20 TABLET ORAL DAILY
Status: DISCONTINUED | OUTPATIENT
Start: 2023-04-01 | End: 2023-04-01 | Stop reason: HOSPADM

## 2023-03-31 RX ORDER — FUROSEMIDE 20 MG/1
20 TABLET ORAL ONCE
Status: COMPLETED | OUTPATIENT
Start: 2023-03-31 | End: 2023-03-31

## 2023-03-31 RX ORDER — PANTOPRAZOLE SODIUM 40 MG/1
40 TABLET, DELAYED RELEASE ORAL
Qty: 30 TABLET | Refills: 1 | Status: SHIPPED | OUTPATIENT
Start: 2023-03-31

## 2023-03-31 RX ORDER — PREDNISONE 10 MG/1
10 TABLET ORAL DAILY
Qty: 15 TABLET | Refills: 0 | Status: SHIPPED | OUTPATIENT
Start: 2023-04-06 | End: 2023-04-12

## 2023-03-31 RX ORDER — PREDNISONE 20 MG/1
20 TABLET ORAL DAILY
Qty: 3 TABLET | Refills: 0 | Status: SHIPPED | OUTPATIENT
Start: 2023-03-31 | End: 2023-03-31

## 2023-03-31 RX ADMIN — DOXYCYCLINE 100 MG: 100 TABLET ORAL at 09:59

## 2023-03-31 RX ADMIN — ASPIRIN 81 MG CHEWABLE TABLET 81 MG: 81 TABLET CHEWABLE at 08:24

## 2023-03-31 RX ADMIN — ALBUTEROL SULFATE 2 PUFF: 108 INHALANT RESPIRATORY (INHALATION) at 15:22

## 2023-03-31 RX ADMIN — FUROSEMIDE 20 MG: 20 TABLET ORAL at 12:39

## 2023-03-31 RX ADMIN — LOSARTAN POTASSIUM 12.5 MG: 25 TABLET, FILM COATED ORAL at 08:24

## 2023-03-31 RX ADMIN — ATORVASTATIN CALCIUM 80 MG: 40 TABLET, FILM COATED ORAL at 20:07

## 2023-03-31 RX ADMIN — METHYLPREDNISOLONE SODIUM SUCCINATE 125 MG: 125 INJECTION, POWDER, FOR SOLUTION INTRAMUSCULAR; INTRAVENOUS at 09:59

## 2023-03-31 RX ADMIN — DOXYCYCLINE 100 MG: 100 TABLET ORAL at 20:07

## 2023-03-31 RX ADMIN — TICAGRELOR 90 MG: 90 TABLET ORAL at 20:07

## 2023-03-31 RX ADMIN — TICAGRELOR 90 MG: 90 TABLET ORAL at 08:24

## 2023-03-31 RX ADMIN — PANTOPRAZOLE SODIUM 40 MG: 40 TABLET, DELAYED RELEASE ORAL at 09:59

## 2023-03-31 RX ADMIN — ALBUTEROL SULFATE 2 PUFF: 108 INHALANT RESPIRATORY (INHALATION) at 18:38

## 2023-03-31 RX ADMIN — METOPROLOL SUCCINATE 50 MG: 50 TABLET, EXTENDED RELEASE ORAL at 08:24

## 2023-03-31 RX ADMIN — ALBUTEROL SULFATE 2 PUFF: 108 INHALANT RESPIRATORY (INHALATION) at 12:17

## 2023-03-31 RX ADMIN — ACETAMINOPHEN 650 MG: 325 TABLET, FILM COATED ORAL at 08:23

## 2023-03-31 NOTE — CASE MANAGEMENT/SOCIAL WORK
Continued Stay Note  OZIEL Ibrahim     Patient Name: Finn Vyas  MRN: [id number]  Today's Date: 3/31/2023    Admit Date: 3/25/2023    Plan: DC Plan: Anticipate Routine Home alone. Brilinta will be affordable at $42 per month with first month free through meds to bed.   Discharge Plan     Row Name 03/31/23 0859       Plan    Plan DC Plan: Anticipate Routine Home alone. Brilinta will be affordable at $42 per month with first month free through meds to bed.    Provided Post Acute Provider List? N/A    Provided Post Acute Provider Quality & Resource List? N/A    Plan Comments CM reviewed chart documentation to obtain clinical updates. Patient heart cath was bumped up to yesterday afternoon 3/30/23 with no new significant issues. Plan for patient to discharge home today. Orders have already been placed. No barriers.           Expected Discharge Date and Time     Expected Discharge Date Expected Discharge Time    Mar 31, 2023 11:45 AM        Phone communication or documentation only- no physical contact with patient or family.      Rach Flores RN     Office Phone: [phone]  Office Cell:     [phone]

## 2023-03-31 NOTE — CONSULTS
[facility]: [facility]         CONSULT NOTE    Patient Identification:  Finn Vyas  77 y.o.  female  1945  [id number]            Requesting physician: Attending physician    Reason for Consultation:  dyspnea       History of Present Illness:   77-year-old female admitted for coronary artery disease today noted requiring oxygen with increasing shortness of breath I was asked to evaluate her   on exam patient had bilateral rales we proceeded with CT chest which confirmed pulmonary edema and Lasix was given with adequate response    Assessment:     dyspnea with mild hypoxia secondary to pulmonary edema   ex-smoker no PFTs   Possible underlying COPD    Recommendations:     CT chest without contrast  Lasix 20 mg p.o. daily   Currently on doxycycline for possible bronchitis  Patient received 1 dose of Solu-Medrol 125 mg   Currently she is on 20  Mg of p.o. prednisone daily for 3 doses   p.r.n. inhaler    Review of Sytems:  Review of Systems   Respiratory: Positive for cough and shortness of breath. Negative for wheezing and stridor.    Cardiovascular: Positive for palpitations and leg swelling. Negative for chest pain.       Past Medical History:  History reviewed. No pertinent past medical history.    Past Surgical History:  Past Surgical History:   Procedure Laterality Date   • CARDIAC CATHETERIZATION N/A 3/25/2023    Procedure: Left Heart Cath;  Surgeon: Nico Carrasco MD;  Location: [facility] CATH INVASIVE LOCATION;  Service: Cardiology;  Laterality: N/A;   • CARDIAC CATHETERIZATION N/A 3/30/2023    Procedure: Left Heart Cath;  Surgeon: Nico Carrasco MD;  Location: [facility] CATH INVASIVE LOCATION;  Service: Cardiology;  Laterality: N/A;        Home Meds:  No medications prior to admission.       Allergies:  No Known Allergies    Social History:   Social History     Socioeconomic History   • Marital status:    Tobacco Use   • Smoking status: Former     Types: Cigarettes     Quit  "date: 3/13/2020     Years since quitting: 3.0   • Smokeless tobacco: Never   Vaping Use   • Vaping Use: Never used   Substance and Sexual Activity   • Alcohol use: Yes     Comment: occasional   • Drug use: Never   • Sexual activity: Not Currently       Family History:  Family History   Problem Relation Age of Onset   • No Known Problems Mother    • No Known Problems Father        Physical Exam:  /67   Pulse 73   Temp 98 °F (36.7 °C) (Oral)   Resp 16   Ht 144.8 cm (57\")   Wt 60.5 kg (133 lb 6.4 oz)   SpO2 95%   BMI 28.87 kg/m²  Body mass index is 28.87 kg/m². 95% 60.5 kg (133 lb 6.4 oz)  Physical Exam  Cardiovascular:      Heart sounds: Murmur heard.     No gallop.   Pulmonary:      Effort: No respiratory distress.      Breath sounds: No stridor. Rhonchi and rales present. No wheezing.   Chest:      Chest wall: No tenderness.         LABS:  Lab Results   Component Value Date    CALCIUM 8.9 03/31/2023     Results from last 7 days   Lab Units 03/31/23  0519 03/31/23  0351 03/30/23  0345 03/30/23  0026 03/29/23  1630 03/29/23  0524   MAGNESIUM mg/dL  --  1.9  --  1.7  --  1.8   SODIUM mmol/L  --  136  --  136  --  138   POTASSIUM mmol/L  --  4.1  --  3.7  --  3.8   CHLORIDE mmol/L  --  103  --  102  --  104   CO2 mmol/L  --  24.0  --  23.0  --  26.0   BUN mg/dL  --  7*  --  9  --  7*   CREATININE mg/dL  --  0.73  --  0.77  --  0.71   GLUCOSE mg/dL  --  98  --  94  --  95   CALCIUM mg/dL  --  8.9  --  8.8  --  8.9   WBC 10*3/mm3 8.10  --  8.30 8.10   < >  --    HEMOGLOBIN g/dL 10.6*  --  10.6* 10.3*   < >  --    PLATELETS 10*3/mm3 154  --  129* 158   < >  --    ALT (SGPT) U/L  --  27  --  20  --  19   AST (SGOT) U/L  --  31  --  31  --  28    < > = values in this interval not displayed.     Lab Results   Component Value Date    TROPONINT 1,016 (C) 03/31/2023     Results from last 7 days   Lab Units 03/31/23  0519 03/31/23  0351 03/28/23  0637   HSTROP T ng/L 1,016* 1,011* 983*             Results from last 7 " days   Lab Units 03/31/23  0345   PH, ARTERIAL pH units 7.390   PCO2, ARTERIAL mm Hg 39.7   PO2 ART mm Hg 81.5*   O2 SATURATION ART % 95.9   MODALITY  Cannula     Results from last 7 days   Lab Units 03/31/23  0959   ADENOVIRUS DETECTION BY PCR  Not Detected   CORONAVIRUS 229E  Not Detected   CORONAVIRUS HKU1  Not Detected   CORONAVIRUS NL63  Not Detected   CORONAVIRUS OC43  Not Detected   HUMAN METAPNEUMOVIRUS  Not Detected   HUMAN RHINOVIRUS/ENTEROVIRUS  Not Detected   INFLUENZA B PCR  Not Detected   PARAINFLUENZA 1  Not Detected   PARAINFLUENZA VIRUS 2  Not Detected   PARAINFLUENZA VIRUS 3  Not Detected   PARAINFLUENZA VIRUS 4  Not Detected   BORDETELLA PERTUSSIS PCR  Not Detected   CHLAMYDOPHILA PNEUMONIAE PCR  Not Detected   MYCOPLAMA PNEUMO PCR  Not Detected   INFLUENZA A PCR  Not Detected   RSV, PCR  Not Detected     Results from last 7 days   Lab Units 03/30/23  0026 03/25/23  0154   INR  1.17* 0.98         No results found for: TSH  Estimated Creatinine Clearance: 54.1 mL/min (by C-G formula based on SCr of 0.73 mg/dL).         Imaging:  Imaging Results (Last 24 Hours)     Procedure Component Value Units Date/Time    CT Chest Without Contrast Diagnostic [[id number]] Collected: 03/31/23 1313     Updated: 03/31/23 1319    Narrative:      CT CHEST WO CONTRAST DIAGNOSTIC    Date of Exam: 3/31/2023 12:27 PM EDT    Indication: soa and wheezing.    Comparison: AP portable chest 3/31/2023. CT chest 8/24/2021.    Technique: Axial CT images were obtained of the chest without contrast administration.  Sagittal and coronal reconstructions were performed.  Automated exposure control and iterative reconstruction methods were used.     Findings:  Smooth interstitial thickening is seen throughout both lungs, predominantly in a bibasilar distribution, in a pattern consistent with interstitial pulmonary edema. Small layering bilateral pleural effusions are present, layering to a depth of 2.47 m on   the right. Passive  atelectasis is demonstrated posteriorly in the bilateral lower lobes. No focal dense consolidative change is identified.    Benign calcified lymph nodes are present in the left hilum, and benign calcified nodules are scattered within the left upper lobe.    There are faint groundglass peribronchiolar nodular densities within the upper lobes which may represent small airways infectious/inflammatory process.    Heart size is normal. Dense coronary artery calcifications are present. Benign calcified mediastinal lymph nodes are present. There is no pericardial effusion. The thyroid gland is normal. There is mild calcific atherosclerosis within the thoracic aorta.    Cholecystectomy changes are present, and the imaged upper abdominal organs demonstrate a normal noncontrast appearance.    Degenerative endplate spurring is present within the thoracic spine. No acute or suspicious osseous abnormalities are identified.      Impression:      Impression:    1. Findings consistent with interstitial pulmonary edema with small layering bilateral pleural effusions and mild passive bilateral lower lobe atelectasis.  2. Mild groundglass peribronchiolar nodular densities predominantly in the upper lobes may represent a component of small airways infectious/inflammatory process.  3. Dense coronary artery calcifications. Correlate with cardiac history.    Electronically Signed: Genna Dixon    3/31/2023 1:17 PM EDT    Workstation ID: [id number]    XR Chest 1 View [[id number]] Collected: 03/31/23 0206     Updated: 03/31/23 0408    Narrative:      FRONTAL VIEW OF THE CHEST    CLINICAL INDICATION: Dyspnea.    COMPARISON: None.    FINDINGS: Bilateral interstitial fullness is present. No focal consolidation, pleural effusion or pneumothorax. Cardiomediastinal morphology is normal.       Impression:      Interstitial fullness could reflect mild edema or chronic change.    Electronically signed by:  Nico Sequeira M.D.    3/31/2023 2:07 AM  [place]            Current Meds:   SCHEDULE  albuterol sulfate HFA, 2 puff, Inhalation, 4x Daily - RT  aspirin, 81 mg, Oral, Daily  atorvastatin, 80 mg, Oral, Nightly  doxycycline, 100 mg, Oral, Q12H  losartan, 12.5 mg, Oral, Q24H  metoprolol succinate XL, 50 mg, Oral, Q24H  pantoprazole, 40 mg, Oral, Q AM  [START ON 4/1/2023] predniSONE, 20 mg, Oral, Daily   Followed by  [START ON 4/4/2023] predniSONE, 15 mg, Oral, Daily   Followed by  [START ON 4/7/2023] predniSONE, 10 mg, Oral, Daily   Followed by  [START ON 4/10/2023] predniSONE, 5 mg, Oral, Daily  ticagrelor, 90 mg, Oral, BID      Infusions     PRNs  •  acetaminophen  •  acetaminophen  •  albuterol  •  loperamide  •  magnesium sulfate **OR** magnesium sulfate **OR** magnesium sulfate  •  nitroglycerin  •  ondansetron  •  phenylephrine  •  potassium chloride  •  potassium chloride        Gale Rowell MD  3/31/2023  17:38 EDT      Much of this encounter note is an electronic transcription/translation of spoken language to printed text using Dragon Software.

## 2023-03-31 NOTE — PROGRESS NOTES
[facility]        LOS:  LOS: 6 days   Patient Name: Finn Vyas  Age/Sex: 77 y.o. female  : 1945  MRN: [id number]    Day of Service: 23   Length of Stay: 6  Encounter Provider: Nico Carrasco MD  Place of Service: [facility] CARDIOLOGY  Patient Care Team:  Fermin Payton MD as PCP - General (Family Medicine)    Subjective:     Chief Complaint: f/u STEMI    Subjective:   Seen and examined  Status post left heart catheterization for reimaging of circumflex, has competitive flow from collaterals from RCA which are well-established protecting the lateral wall  We will attempt staged revascularization in 4 to 6 weeks  Shortness of breath overnight, wheezing on encounter    Low-grade fever 99  Viral respiratory panel ordered  Doxycycline ordered twice daily for 10 days  Prednisone taper ordered, single dose of Solu-Medrol now prior to anticipated discharge  Walking oximetry if not adequate may need to hold discharge today  DuoNeb ordered for now  Albuterol inhaler ordered for home    Aspirin and Brilinta continue  Metoprolol XL  Low-dose losartan  Imdur  High intensity statin therapy    Status post overlapping stents to the RCA with revascularization of RCA system, LAD nonobstructive disease, collateralization of the circumflex system from RCA with preserved EF and normal filling pressures no evidence of heart failure    Current Medications:   Scheduled Meds:albuterol sulfate HFA, 2 puff, Inhalation, 4x Daily - RT  aspirin, 81 mg, Oral, Daily  atorvastatin, 80 mg, Oral, Nightly  doxycycline, 100 mg, Oral, Q12H  losartan, 12.5 mg, Oral, Q24H  methylPREDNISolone sodium succinate, 125 mg, Intravenous, Once  metoprolol succinate XL, 50 mg, Oral, Q24H  pantoprazole, 40 mg, Oral, Q AM  predniSONE, 20 mg, Oral, Daily   Followed by  [START ON 4/3/2023] predniSONE, 15 mg, Oral, Daily   Followed by  [START ON 2023] predniSONE, 10 mg, Oral, Daily   Followed  by  [START ON 4/9/2023] predniSONE, 5 mg, Oral, Daily  ticagrelor, 90 mg, Oral, BID      Continuous Infusions:     Allergies:  No Known Allergies    Review of Systems   Constitutional: Negative for chills, diaphoresis and malaise/fatigue.   Cardiovascular: Negative for chest pain, dyspnea on exertion, irregular heartbeat, leg swelling, near-syncope, orthopnea, palpitations, paroxysmal nocturnal dyspnea and syncope.   Respiratory: Negative for cough, shortness of breath, sleep disturbances due to breathing and sputum production.    Gastrointestinal: Negative for change in bowel habit.   Genitourinary: Negative for urgency.   Neurological: Negative for dizziness and headaches.   Psychiatric/Behavioral: Negative for altered mental status.         Objective:     Temp:  [97.8 °F (36.6 °C)-99.1 °F (37.3 °C)] 99.1 °F (37.3 °C)  Heart Rate:  [74-91] 85  Resp:  [12-16] 12  BP: ()/(53-84) 139/76     Intake/Output Summary (Last 24 hours) at 3/31/2023 0946  Last data filed at 3/31/2023 0600  Gross per 24 hour   Intake 618 ml   Output 200 ml   Net 418 ml     Body mass index is 28.87 kg/m².      03/26/23  0750 03/28/23  0000 03/29/23  0320   Weight: 61.2 kg (135 lb) 62.5 kg (137 lb 12.6 oz) 60.5 kg (133 lb 6.4 oz)         General Appearance:    Alert, cooperative, in no acute distress                                Head: Atraumatic, normocephalic, PERRLA, nosebleed               Neck:   supple,  no JVD   Lungs:    Wheezing on encounter, no rales rhonchi's no crackles    Heart:    Regular rhythm and normal rate, normal S1 and S2   Abdomen:     Normal bowel sounds, no masses, no organomegaly, soft  nontender, nondistended, no guarding, no rebound  tenderness   Extremities:   Moves all extremities well, no edema, no cyanosis, no  redness   Pulses:   Pulses palpable and equal bilaterally   Skin:   No bleeding, bruising or rash   Neurologic:   Awake, alert, oriented x3  No deficits       Unchanged from prior encounter    Lab  Review:   Results from last 7 days   Lab Units 03/31/23  0351 03/30/23  0026   SODIUM mmol/L 136 136   POTASSIUM mmol/L 4.1 3.7   CHLORIDE mmol/L 103 102   CO2 mmol/L 24.0 23.0   BUN mg/dL 7* 9   CREATININE mg/dL 0.73 0.77   GLUCOSE mg/dL 98 94   CALCIUM mg/dL 8.9 8.8   AST (SGOT) U/L 31 31   ALT (SGPT) U/L 27 20     Results from last 7 days   Lab Units 03/31/23  0519 03/31/23  0351 03/28/23  0637 03/28/23  0410 03/27/23  2129 03/27/23  0312 03/26/23  0543 03/25/23  0154   HSTROP T ng/L 1,016* 1,011* 983* 990* 767* 667* 1,005* 291*     Results from last 7 days   Lab Units 03/31/23  0519 03/30/23  0345   WBC 10*3/mm3 8.10 8.30   HEMOGLOBIN g/dL 10.6* 10.6*   HEMATOCRIT % 31.5* 32.9*   PLATELETS 10*3/mm3 154 129*     Results from last 7 days   Lab Units 03/31/23  0351 03/30/23  1144 03/30/23  0345 03/30/23  0026 03/25/23  1830 03/25/23  0154   INR   --   --   --  1.17*  --  0.98   APTT seconds 29.2* 39.7*   < > 123.1*   < > 23.6*    < > = values in this interval not displayed.     Results from last 7 days   Lab Units 03/31/23  0351 03/30/23  0026   MAGNESIUM mg/dL 1.9 1.7     Results from last 7 days   Lab Units 03/31/23  0351 03/26/23  0543   CHOLESTEROL mg/dL 113 172   TRIGLYCERIDES mg/dL 91 130   HDL CHOL mg/dL 42 50               Recent Radiology:  Imaging Results (Most Recent)     Procedure Component Value Units Date/Time    XR Chest 1 View [[id number]] Collected: 03/31/23 0206     Updated: 03/31/23 0408    Narrative:      FRONTAL VIEW OF THE CHEST    CLINICAL INDICATION: Dyspnea.    COMPARISON: None.    FINDINGS: Bilateral interstitial fullness is present. No focal consolidation, pleural effusion or pneumothorax. Cardiomediastinal morphology is normal.       Impression:      Interstitial fullness could reflect mild edema or chronic change.    Electronically signed by:  Nico Sequeira M.D.    3/31/2023 2:07 AM Mountain Time    XR Chest 1 View [[id number]] Collected: 03/25/23 0724     Updated: 03/25/23 0728     Narrative:      XR CHEST 1 VW    Date of Exam: 3/25/2023 2:22 AM EDT    Indication: cp.    Comparison: August 11, 2021    Findings:  The heart looks enlarged. The lungs seem relatively clear. There are no pleural effusions.      Impression:      Impression:  1.Cardiomegaly.    Electronically Signed: Paulo Pena    3/25/2023 7:26 AM EDT    Workstation ID: [id number]          ECHOCARDIOGRAM:    Results for orders placed during the hospital encounter of 03/25/23    Adult Transthoracic Echo Complete W/ Cont if Necessary Per Protocol    Interpretation Summary  •  Left ventricular systolic function is low normal. Left ventricular ejection fraction appears to be 51 - 55%.  •  Mildly reduced right ventricular systolic function noted.  •  Left atrial volume is mildly increased.  •  Moderate mitral valve regurgitation is present.  •  Estimated right ventricular systolic pressure from tricuspid regurgitation is normal (<35 mmHg).    Basal to mid inferior and inferolateral wall are hypokinetic, ischemic injury  Other segments contract normally  Overall EF appears 50%  Moderate MR eccentric, mild left atrial enlargement  Aortic valve is normal  IVC mildly enlarged with right atrial pressure estimated 10-15  No masses or effusions  RV mildly hypokinetic, normal size        I reviewed the patient's new clinical results.    EKG:      Assessment:       ST elevation myocardial infarction (STEMI), unspecified artery (HCC)    1. STEMI  - s/p [facility] 3/25/2023: Percutaneous coronary intervention to the RCA with overlapping 2.25 x 38 and 3.0 x 28 Xience drug-eluting stents postdilated 3.0 mm at 22 jossie with great angiographic results, 100% stenosis reduced to 0% residual  - troponin flat and stable  - 2D echo reviewed, basal to mid inferior wall somewhat hypokinetic, overall preserved EF 55%,   Repeat heart cath demonstrates heavy collateralization from right to left with competitive flow with subtotal lesion in the circumflex needing staged  PCI in 4 weeks    - DAPT with ASA / Brilinta   - continue statin therapy     2. Thrombocytopenia- continue to monitor    3. Moderate MR, LVEF 50%    4. Borderline blood pressure, stop losartan    Plan:   Intermittent use of morphine and nitroglycerin for chest pain  Plan for reimaging left system on Friday, IFR or FFR LAD  Reimaging ostial proximal circumflex  Wire was subintimal course even simply to try to pass a very soft workhorse wire BMW despite extreme care, procedure aborted initially  Troponin trended up but plateaued at 900 high-sensitivity assay  Chest pain free currently on encounter this morning    Discontinue heparin  Continue DAPT ASA / Brilinta  Monitor plt--stable 1 20-1 30,000  Continue telemetry  Statin therapy on board    Staged PCI within 4 to 6 weeks for addressing circumflex into obtuse marginal  Heavy collateralization of lateral wall circulation from RCA  Continue aspirin Brilinta  High intensity statin  Metoprolol XL  Low-dose losartan    Wheezing on encounter today  , New finding, with low-grade fever  Solu-Medrol x1, DuoNeb x1, viral respiratory panel pending  Prednisone taper ordered  Doxycycline 100 twice daily, will ask pulmonary to comment  Walking oximetry      We will see if we can discharge her today safely if not we will need further optimization from respiratory standpoint with new onset low-grade fever, wheezing today to avoid readmission    Nico Carrasco MD, PhD      Nico Carrasco MD  03/31/23  09:46 EDT

## 2023-03-31 NOTE — CASE MANAGEMENT/SOCIAL WORK
Continued Stay Note   Pat[facility]     Patient Name: Finn Vyas  MRN: [id number]  Today's Date: 3/31/2023    Admit Date: 3/25/2023    Plan: DC Plan: Anticipate Routine Home alone. Brilinta will be affordable at $42 per month with first month free through meds to bed.   Discharge Plan     Row Name 03/31/23 1437       Plan    Plan DC Plan: Anticipate Routine Home alone. Brilinta will be affordable at $42 per month with first month free through meds to bed.    Provided Post Acute Provider List? N/A    Provided Post Acute Provider Quality & Resource List? N/A    Plan Comments Patient began to complain of SOA post discharge orders. Walking oximetry performed and patient passed but still short of air. Dr. Rowell saw patient and placed order for CT chest for possible pulmonary edema vs. PNA and oral abx started. Discharge delayed. Patient downgraded to PCU level of care. CM will continue to follow for any further needs and adjust discharge plan accordingly. DC Barriers: O2@2L nc, shortness of air, and  Pending Chest CT results           Expected Discharge Date and Time     Expected Discharge Date Expected Discharge Time    Apr 1, 2023 11:45 AM        Phone communication or documentation only- no physical contact with patient or family.      Rach Flores RN      Office Phone: [phone]  Office Cell:     [phone]

## 2023-03-31 NOTE — PROGRESS NOTES
Exercise Oximetry    Patient Name:Finn Vyas   MRN: [id number]   Date: 03/31/23             ROOM AIR BASELINE   SpO2% 93   Heart Rate    Blood Pressure      EXERCISE ON ROOM AIR SpO2% EXERCISE ON O2 @  LPM SpO2%   1 MINUTE 94 1 MINUTE    2 MINUTES 93 2 MINUTES    3 MINUTES 92 3 MINUTES    4 MINUTES 90 4 MINUTES    5 MINUTES 91 5 MINUTES    6 MINUTES 90 6 MINUTES               Distance Walked   Distance Walked   Dyspnea (Du Scale)   Dyspnea (Du Scale)   Fatigue (Du Scale)   Fatigue (Du Scale)   SpO2% Post Exercise   SpO2% Post Exercise   HR Post Exercise   HR Post Exercise   Time to Recovery   Time to Recovery     Comments: Pt sat did not drop below 90% while walking on RA. Pt stated she was short of breath while walking. Pt does not require oxygen at this time.     Sneha Lucas, CRT   03/31/23  11:40 EDT

## 2023-03-31 NOTE — PROGRESS NOTES
Critical Care Progress Note   Finn Vyas : 1945 MRN:[id number] LOS:6     Principal Problem: ST elevation myocardial infarction (STEMI), unspecified artery (HCC)     Reason for follow up: All the medical problems listed below    Summary     A 77 y.o. female admitted with a principal diagnosis of ST elevation myocardial infarction (STEMI), unspecified artery (HCC).  Patient underwent PCTA to the RCA with a HONEY, as this was the culprit lesion.  But during cardiac catheterization, IFR was done on LAD as well as circumflex, and plan was initially for possible surgical bypass or possible staged PCI.  Patient was evaluated by CTS, and determined not to be a surgical candidate, therefore patient will proceed back for cardiac catheterization on 3/31 for staged PCI.    Significant events     23: Underwent cardiac catheterization on 3/30, with recovered EF, no further intervention but planned staged intervention in 4-6 weeks.  Per cardiology, patient with mild shortness of breath, CXR with evidence of pulmonary edema.  Consulted pulmonology, Dr. Rowell, started on antimicrobials, diuretics given.  Will downgrade patient to PCU and consult hospitalist service.    Assessment / Plan     STEMI (ST elevation myocardial infarction) with unstable angina, involving RCA  Coronary artery disease  Hyperlipidemia  -Underwent urgent cardiac catheterization in which patient had HONEY placed in RCA  -On losartan, metoprolol for HTN; losartan held on 3/29 due to borderline blood pressure, defer management to cardiology.  -A1c wnl.  -On aspirin, Brillinta, heparin gtt, heparin gtt discontinued by cardiology on 3/30.  -On BB, ARB being held due to borderline blood pressures  -Lipid panel noted, already on statin therapy  -Cardiothoracic surgery consulted--no plans for surgery this admission  -Off NTG gtt.  -Defer primary mgmt to cardiology.  -Discussed with cardiology, recommended continued management in ICU.  -Cardiac Cath on  "3/30: \"1.  Normal LVEF, recovered after inferior STEMI with revascularization, subtotal occlusion of the circumflex with competitive flow however heavily collateralized in retrograde fashion from the RCA, protected myocardial distribution of the circumflex at this time, nonobstructive disease LAD, normal filling pressures no evidence of heart failure.  Plan: Staged PCI to the circumflex in 4 to 6 weeks after he has had a chance to feel depending on symptoms, Continue DAPT, High intensity statin therapy, Beta-blocker for goal heart rate 60-70, nitrates if needed, Fully recovered EF, ACE inhibitors if tolerated from hemodynamic standpoint for afterload reduction.\"    Shortness of breath  Pulmonary edema  · Pulmonology consulted by Cardiology  · Started on Doxycycline  · CXR reviewed.    Moderate MR  -Cardiology following     Thrombocytopenia  -Continue to monitor and trend labs, improving    Chronic diarrhea  -Not on home regimen, made all bowel regimen as needed.    Code status:   Level Of Support Discussed With: Patient  Code Status (Patient has no pulse and is not breathing): CPR (Attempt to Resuscitate)  Medical Interventions (Patient has pulse or is breathing): Full Support  Release to patient: Routine Release       Nutrition: Diet: Cardiac Diets; Healthy Heart (2-3 Na+); Texture: Regular Texture (IDDSI 7); Fluid Consistency: Thin (IDDSI 0)   Patient isn't on Tube Feeding    DVT prophylaxis:   Mechanical Order History:     None      Pharmalogical Order History:      Ordered     Dose Route Frequency Stop    03/25/23 0534  heparin 51741 units/250 mL (100 units/mL) in 0.45 % NaCl infusion  7.48 mL/hr         12 Units/kg/hr IV Titrated --    03/25/23 0450  heparin 34392 units/250 mL (100 units/mL) in 0.45 % NaCl infusion  7.48 mL/hr,   Status:  Discontinued         12 Units/kg/hr IV Titrated 03/25/23 0534 03/25/23 0534  heparin bolus from bag 1,900 Units         30 Units/kg IV Every 6 Hours PRN --    03/25/23 0534 "  heparin bolus from bag 3,700 Units         60 Units/kg IV Every 6 Hours PRN --    03/25/23 0450  heparin bolus from bag 1,900 Units  Status:  Discontinued         30 Units/kg IV Every 6 Hours PRN 03/25/23 0534    03/25/23 0451  heparin bolus from bag 3,700 Units  Status:  Discontinued         60 Units/kg IV Every 6 Hours PRN 03/25/23 0534    03/25/23 0309  heparin (porcine) injection  Status:  Discontinued         -- -- Code / Trauma / Sedation Medication 03/25/23 0436    03/25/23 0153  heparin (porcine) 5000 UNIT/ML injection         -- IV Code / Trauma / Sedation Medication 03/25/23 0153    03/25/23 0150  heparin (porcine) 1000 UNIT/ML injection  - ADS Override Pull        Note to Pharmacy: Created by cabinet override    -- -- -- 03/25/23 0801                 Subjective / Review of systems     Review of Systems   Constitutional: Negative for chills and fever.   Respiratory: Negative for cough and shortness of breath.    Cardiovascular: Negative for chest pain and palpitations.   Gastrointestinal: Positive for diarrhea. Negative for abdominal distention, abdominal pain, nausea and vomiting.   Endocrine: Negative for cold intolerance and heat intolerance.   Genitourinary: Negative for dysuria and flank pain.   Musculoskeletal: Negative for back pain.   Neurological: Negative for dizziness, syncope and headaches.   Hematological: Negative for adenopathy. Does not bruise/bleed easily.   Psychiatric/Behavioral: Negative for confusion. The patient is not nervous/anxious.      Objective / Physical Exam     Vital signs:  Temp: 97.9 °F (36.6 °C)  BP: 139/76  Heart Rate: 71  Resp: 12  SpO2: 97 %  Weight: 60.5 kg (133 lb 6.4 oz)    Admission Weight: Weight: 62.4 kg (137 lb 9.6 oz)  Current Weight: Weight: 60.5 kg (133 lb 6.4 oz)    Input/Output in last 24 hours:    Intake/Output Summary (Last 24 hours) at 3/31/2023 1205  Last data filed at 3/31/2023 0600  Gross per 24 hour   Intake 498 ml   Output 200 ml   Net 298 ml       Physical Exam  Vitals and nursing note reviewed.   Constitutional:       General: She is not in acute distress.     Appearance: Normal appearance. She is not ill-appearing, toxic-appearing or diaphoretic.   HENT:      Head: Normocephalic and atraumatic.      Nose: Nose normal. No congestion.      Mouth/Throat:      Mouth: Mucous membranes are moist.      Pharynx: Oropharynx is clear. No oropharyngeal exudate.   Eyes:      General: No scleral icterus.     Extraocular Movements: Extraocular movements intact.      Conjunctiva/sclera: Conjunctivae normal.      Pupils: Pupils are equal, round, and reactive to light.   Cardiovascular:      Rate and Rhythm: Normal rate and regular rhythm.      Pulses: Normal pulses.      Heart sounds: Normal heart sounds. No murmur heard.    No gallop.   Pulmonary:      Breath sounds: Normal breath sounds. No wheezing or rales.   Abdominal:      General: Bowel sounds are normal.      Palpations: Abdomen is soft.      Tenderness: There is no abdominal tenderness.   Musculoskeletal:         General: No tenderness. Normal range of motion.      Cervical back: Neck supple.      Right lower leg: No edema.      Left lower leg: No edema.   Skin:     General: Skin is warm and dry.      Findings: No rash.   Neurological:      General: No focal deficit present.      Mental Status: She is alert and oriented to person, place, and time.   Psychiatric:      Comments: Calm, cooperative.          Radiology and Labs     Results from last 7 days   Lab Units 03/31/23  0519 03/30/23  0345 03/30/23  0026 03/29/23  1630 03/28/23  0410   WBC 10*3/mm3 8.10 8.30 8.10 6.90 6.40   HEMATOCRIT % 31.5* 32.9* 31.7* 31.4* 30.0*   PLATELETS 10*3/mm3 154 129* 158 143 118*      Results from last 7 days   Lab Units 03/31/23  0351 03/30/23  0026 03/29/23  0524 03/28/23  0410 03/27/23  0312   SODIUM mmol/L 136 136 138 136 139   POTASSIUM mmol/L 4.1 3.7 3.8 3.9 3.9   CHLORIDE mmol/L 103 102 104 106 104   CO2 mmol/L 24.0 23.0  26.0 24.0 24.0   BUN mg/dL 7* 9 7* 10 12   CREATININE mg/dL 0.73 0.77 0.71 0.64 0.66     XR Chest 1 View    Result Date: 3/31/2023  Interstitial fullness could reflect mild edema or chronic change. Electronically signed by:  Nico Sequeira M.D.  3/31/2023 2:07 AM Mountain Time      Current medications   Scheduled Meds:   albuterol sulfate HFA, 2 puff, Inhalation, 4x Daily - RT  aspirin, 81 mg, Oral, Daily  atorvastatin, 80 mg, Oral, Nightly  doxycycline, 100 mg, Oral, Q12H  furosemide, 20 mg, Oral, Once  losartan, 12.5 mg, Oral, Q24H  metoprolol succinate XL, 50 mg, Oral, Q24H  pantoprazole, 40 mg, Oral, Q AM  [START ON 4/1/2023] predniSONE, 20 mg, Oral, Daily   Followed by  [START ON 4/4/2023] predniSONE, 15 mg, Oral, Daily   Followed by  [START ON 4/7/2023] predniSONE, 10 mg, Oral, Daily   Followed by  [START ON 4/10/2023] predniSONE, 5 mg, Oral, Daily  ticagrelor, 90 mg, Oral, BID      Continuous Infusions:        Plan discussed with RN. Reviewed all other data in the last 24 hours, including but not limited to vitals, labs, microbiology, imaging and pertinent notes from other providers.     PRECIOUS [place]   Critical Care  03/31/23   12:05 EDT

## 2023-04-01 ENCOUNTER — READMISSION MANAGEMENT (OUTPATIENT)
Dept: CALL CENTER | Facility: HOSPITAL | Age: 78
End: 2023-04-01
Payer: MEDICARE

## 2023-04-01 VITALS
BODY MASS INDEX: 28.59 KG/M2 | SYSTOLIC BLOOD PRESSURE: 128 MMHG | TEMPERATURE: 97.5 F | WEIGHT: 132.5 LBS | RESPIRATION RATE: 16 BRPM | OXYGEN SATURATION: 96 % | DIASTOLIC BLOOD PRESSURE: 68 MMHG | HEART RATE: 73 BPM | HEIGHT: 57 IN

## 2023-04-01 LAB
ALBUMIN SERPL-MCNC: 3.5 G/DL (ref 3.5–5.2)
ALBUMIN/GLOB SERPL: 1.4 G/DL
ALP SERPL-CCNC: 84 U/L (ref 39–117)
ALT SERPL W P-5'-P-CCNC: 23 U/L (ref 1–33)
ANION GAP SERPL CALCULATED.3IONS-SCNC: 11 MMOL/L (ref 5–15)
APTT PPP: 29.7 SECONDS (ref 61–76.5)
AST SERPL-CCNC: 17 U/L (ref 1–32)
BILIRUB SERPL-MCNC: 0.5 MG/DL (ref 0–1.2)
BUN SERPL-MCNC: 10 MG/DL (ref 8–23)
BUN/CREAT SERPL: 13.9 (ref 7–25)
CALCIUM SPEC-SCNC: 9.4 MG/DL (ref 8.6–10.5)
CHLORIDE SERPL-SCNC: 101 MMOL/L (ref 98–107)
CO2 SERPL-SCNC: 26 MMOL/L (ref 22–29)
CREAT SERPL-MCNC: 0.72 MG/DL (ref 0.57–1)
DEPRECATED RDW RBC AUTO: 43.8 FL (ref 37–54)
EGFRCR SERPLBLD CKD-EPI 2021: 87.3 ML/MIN/1.73
ERYTHROCYTE [DISTWIDTH] IN BLOOD BY AUTOMATED COUNT: 13.9 % (ref 12.3–15.4)
GLOBULIN UR ELPH-MCNC: 2.5 GM/DL
GLUCOSE SERPL-MCNC: 141 MG/DL (ref 65–99)
HCT VFR BLD AUTO: 31.7 % (ref 34–46.6)
HGB BLD-MCNC: 10.1 G/DL (ref 12–15.9)
MAGNESIUM SERPL-MCNC: 1.8 MG/DL (ref 1.6–2.4)
MCH RBC QN AUTO: 29.2 PG (ref 26.6–33)
MCHC RBC AUTO-ENTMCNC: 31.9 G/DL (ref 31.5–35.7)
MCV RBC AUTO: 91.4 FL (ref 79–97)
PLATELET # BLD AUTO: 171 10*3/MM3 (ref 140–450)
PMV BLD AUTO: 8.3 FL (ref 6–12)
POTASSIUM SERPL-SCNC: 4 MMOL/L (ref 3.5–5.2)
PROT SERPL-MCNC: 6 G/DL (ref 6–8.5)
RBC # BLD AUTO: 3.47 10*6/MM3 (ref 3.77–5.28)
SODIUM SERPL-SCNC: 138 MMOL/L (ref 136–145)
WBC NRBC COR # BLD: 10.1 10*3/MM3 (ref 3.4–10.8)

## 2023-04-01 PROCEDURE — 85730 THROMBOPLASTIN TIME PARTIAL: CPT | Performed by: NURSE PRACTITIONER

## 2023-04-01 PROCEDURE — 94799 UNLISTED PULMONARY SVC/PX: CPT

## 2023-04-01 PROCEDURE — 63710000001 PREDNISONE PER 1 MG: Performed by: NURSE PRACTITIONER

## 2023-04-01 PROCEDURE — 99232 SBSQ HOSP IP/OBS MODERATE 35: CPT | Performed by: INTERNAL MEDICINE

## 2023-04-01 PROCEDURE — 83735 ASSAY OF MAGNESIUM: CPT | Performed by: NURSE PRACTITIONER

## 2023-04-01 PROCEDURE — 94664 DEMO&/EVAL PT USE INHALER: CPT

## 2023-04-01 PROCEDURE — 85027 COMPLETE CBC AUTOMATED: CPT | Performed by: NURSE PRACTITIONER

## 2023-04-01 PROCEDURE — 80053 COMPREHEN METABOLIC PANEL: CPT | Performed by: NURSE PRACTITIONER

## 2023-04-01 RX ADMIN — TICAGRELOR 90 MG: 90 TABLET ORAL at 08:55

## 2023-04-01 RX ADMIN — METOPROLOL SUCCINATE 50 MG: 50 TABLET, EXTENDED RELEASE ORAL at 08:55

## 2023-04-01 RX ADMIN — ASPIRIN 81 MG CHEWABLE TABLET 81 MG: 81 TABLET CHEWABLE at 08:55

## 2023-04-01 RX ADMIN — PREDNISONE 20 MG: 20 TABLET ORAL at 08:54

## 2023-04-01 RX ADMIN — LOSARTAN POTASSIUM 12.5 MG: 25 TABLET, FILM COATED ORAL at 08:55

## 2023-04-01 RX ADMIN — PANTOPRAZOLE SODIUM 40 MG: 40 TABLET, DELAYED RELEASE ORAL at 05:18

## 2023-04-01 RX ADMIN — ALBUTEROL SULFATE 2 PUFF: 108 INHALANT RESPIRATORY (INHALATION) at 11:36

## 2023-04-01 RX ADMIN — DOXYCYCLINE 100 MG: 100 TABLET ORAL at 08:54

## 2023-04-01 RX ADMIN — ALBUTEROL SULFATE 2 PUFF: 108 INHALANT RESPIRATORY (INHALATION) at 07:37

## 2023-04-01 NOTE — PROGRESS NOTES
Referring Provider: Nico Carrasco,*    Reason for follow-up: CAD     Patient Care Team:  Fermin Payton MD as PCP - General (Family Medicine)      SUBJECTIVE  Denies any chest pain or shortness of breath.  Ready to go home.       ROS  Review of all systems negative except as indicated.    Since I have last seen, the patient has been without any chest discomfort, shortness of breath, palpitations, dizziness or syncope.  Denies having any headache, abdominal pain, nausea, vomiting, diarrhea, constipation, loss of weight or loss of appetite.  Denies having any excessive bruising, hematuria or blood in the stool.  ROS      Personal History:    History reviewed. No pertinent past medical history.    Past Surgical History:   Procedure Laterality Date   • CARDIAC CATHETERIZATION N/A 3/25/2023    Procedure: Left Heart Cath;  Surgeon: Nico Carrasco MD;  Location: [facility] CATH INVASIVE LOCATION;  Service: Cardiology;  Laterality: N/A;   • CARDIAC CATHETERIZATION N/A 3/30/2023    Procedure: Left Heart Cath;  Surgeon: Nico Carrasco MD;  Location: [facility] CATH INVASIVE LOCATION;  Service: Cardiology;  Laterality: N/A;       Family History   Problem Relation Age of Onset   • No Known Problems Mother    • No Known Problems Father        Social History     Tobacco Use   • Smoking status: Former     Types: Cigarettes     Quit date: 3/13/2020     Years since quitting: 3.0   • Smokeless tobacco: Never   Vaping Use   • Vaping Use: Never used   Substance Use Topics   • Alcohol use: Yes     Comment: occasional   • Drug use: Never        No medications prior to admission.       Allergies:  Patient has no known allergies.    Scheduled Meds:albuterol sulfate HFA, 2 puff, Inhalation, 4x Daily - RT  aspirin, 81 mg, Oral, Daily  atorvastatin, 80 mg, Oral, Nightly  doxycycline, 100 mg, Oral, Q12H  losartan, 12.5 mg, Oral, Q24H  metoprolol succinate XL, 50 mg, Oral, Q24H  pantoprazole, 40 mg, Oral, Q  "AM  predniSONE, 20 mg, Oral, Daily   Followed by  [START ON 4/4/2023] predniSONE, 15 mg, Oral, Daily   Followed by  [START ON 4/7/2023] predniSONE, 10 mg, Oral, Daily   Followed by  [START ON 4/10/2023] predniSONE, 5 mg, Oral, Daily  ticagrelor, 90 mg, Oral, BID      Continuous Infusions:   PRN Meds:.•  acetaminophen  •  acetaminophen  •  albuterol  •  loperamide  •  magnesium sulfate **OR** magnesium sulfate **OR** magnesium sulfate  •  nitroglycerin  •  ondansetron  •  phenylephrine  •  potassium chloride  •  potassium chloride      OBJECTIVE    Vital Signs  Vitals:    04/01/23 1020 04/01/23 1136 04/01/23 1137 04/01/23 1147   BP: 122/60      BP Location: Right arm      Patient Position: Sitting      Pulse: 83 70 68 73   Resp: 16 18 18 20   Temp:    97.8 °F (36.6 °C)   TempSrc:    Oral   SpO2: 97% 97% 96% 100%   Weight:       Height:           Flowsheet Rows    Flowsheet Row First Filed Value   Admission Height 144.8 cm (57\") Documented at 03/25/2023 0132   Admission Weight 62.4 kg (137 lb 9.6 oz) Documented at 03/25/2023 0140            Intake/Output Summary (Last 24 hours) at 4/1/2023 1418  Last data filed at 4/1/2023 1200  Gross per 24 hour   Intake 1320 ml   Output 1500 ml   Net -180 ml          Telemetry:  NSR    Physical Exam:  The patient is alert, oriented and in no distress.  Vital signs as noted above.  Head and neck revealed no carotid bruits or jugular venous distention.  No thyromegaly or lymphadenopathy is present  Lungs clear.  No wheezing.  Breath sounds are normal bilaterally.  Heart normal first and second heart sounds.  No murmur. No precordial rub is present.  No gallop is present.  Abdomen soft and nontender.  No organomegaly is present.  Extremities with good peripheral pulses without any pedal edema.  Skin warm and dry.  Musculoskeletal system is grossly normal.  CNS grossly normal.       Results Review:  I have personally reviewed the results from the time of this admission to 4/1/2023 14:18 " EDT and agree with these findings:  []  Laboratory  []  Microbiology  []  Radiology  []  EKG/Telemetry   []  Cardiology/Vascular   []  Pathology  []  Old records  []  Other:    Most notable findings include:    Lab Results (last 24 hours)     Procedure Component Value Units Date/Time    CBC (No Diff) [[id number]]  (Abnormal) Collected: 04/01/23 0518    Specimen: Blood Updated: 04/01/23 0630     WBC 10.10 10*3/mm3      RBC 3.47 10*6/mm3      Hemoglobin 10.1 g/dL      Hematocrit 31.7 %      MCV 91.4 fL      MCH 29.2 pg      MCHC 31.9 g/dL      RDW 13.9 %      RDW-SD 43.8 fl      MPV 8.3 fL      Platelets 171 10*3/mm3      Comment: Platelet count performed on sodium citrate tube due to EDTA clumping.        aPTT [[id number]]  (Abnormal) Collected: 04/01/23 0518    Specimen: Blood Updated: 04/01/23 0605     PTT 29.7 seconds     Comprehensive Metabolic Panel [[id number]]  (Abnormal) Collected: 04/01/23 0518    Specimen: Blood Updated: 04/01/23 0557     Glucose 141 mg/dL      BUN 10 mg/dL      Creatinine 0.72 mg/dL      Sodium 138 mmol/L      Potassium 4.0 mmol/L      Chloride 101 mmol/L      CO2 26.0 mmol/L      Calcium 9.4 mg/dL      Total Protein 6.0 g/dL      Albumin 3.5 g/dL      ALT (SGPT) 23 U/L      AST (SGOT) 17 U/L      Alkaline Phosphatase 84 U/L      Total Bilirubin 0.5 mg/dL      Globulin 2.5 gm/dL      A/G Ratio 1.4 g/dL      BUN/Creatinine Ratio 13.9     Anion Gap 11.0 mmol/L      eGFR 87.3 mL/min/1.73     Narrative:      GFR Normal >60  Chronic Kidney Disease <60  Kidney Failure <15    The GFR formula is only valid for adults with stable renal function between ages 18 and 70.    Magnesium [[id number]]  (Normal) Collected: 04/01/23 0518    Specimen: Blood Updated: 04/01/23 0557     Magnesium 1.8 mg/dL     Heparin Induced PLT Antibody With / Rfx [[id number]] Collected: 03/30/23 0345    Specimen: Blood Updated: 03/31/23 1612     Heparin Induced Plt Ab 0.080 OD     Narrative:      Performed at:  01 - [facility]  [address]  [id number]  : Nalini Scott MD, Phone:  [phone]    [employer] [[id number]] Collected: 03/30/23 0345    Specimen: Blood Updated: 03/31/23 1510     FDP <5 ug/mL     Narrative:      Performed at:  01 - [address]  [id number]  : Nalini Scott MD, Phone:  [phone]          Imaging Results (Last 24 Hours)     ** No results found for the last 24 hours. **          LAB RESULTS (LAST 7 DAYS)    CBC  Results from last 7 days   Lab Units 04/01/23  0518 03/31/23  0519 03/30/23  0345 03/30/23  0026 03/29/23  1630 03/28/23  0410 03/27/23  1048   WBC 10*3/mm3 10.10 8.10 8.30 8.10 6.90 6.40 6.60   RBC 10*6/mm3 3.47* 3.48* 3.57* 3.46* 3.48* 3.29* 3.59*   HEMOGLOBIN g/dL 10.1* 10.6* 10.6* 10.3* 10.5* 9.9* 10.9*   HEMATOCRIT % 31.7* 31.5* 32.9* 31.7* 31.4* 30.0* 32.6*   MCV fL 91.4 90.6 92.3 91.7 90.1 91.2 91.0   PLATELETS 10*3/mm3 171 154 129* 158 143 118* 118*  118*       BMP  Results from last 7 days   Lab Units 04/01/23  0518 03/31/23  0351 03/30/23  0026 03/29/23  0524 03/28/23  0410 03/27/23  0312 03/26/23  0543   SODIUM mmol/L 138 136 136 138 136 139 137   POTASSIUM mmol/L 4.0 4.1 3.7 3.8 3.9 3.9 4.0   CHLORIDE mmol/L 101 103 102 104 106 104 104   CO2 mmol/L 26.0 24.0 23.0 26.0 24.0 24.0 24.0   BUN mg/dL 10 7* 9 7* 10 12 11   CREATININE mg/dL 0.72 0.73 0.77 0.71 0.64 0.66 0.61   GLUCOSE mg/dL 141* 98 94 95 100* 103* 94   MAGNESIUM mg/dL 1.8 1.9 1.7 1.8 2.0 1.9 1.8       CMP   Results from last 7 days   Lab Units 04/01/23  0518 03/31/23  0351 03/30/23  0026 03/29/23  0524 03/28/23  0410 03/27/23  0312 03/26/23  0543   SODIUM mmol/L 138 136 136 138 136 139 137   POTASSIUM mmol/L 4.0 4.1 3.7 3.8 3.9 3.9 4.0   CHLORIDE mmol/L 101 103 102 104 106 104 104   CO2 mmol/L 26.0 24.0 23.0 26.0 24.0 24.0 24.0   BUN mg/dL 10 7* 9 7* 10 12 11   CREATININE mg/dL 0.72 0.73 0.77 0.71 0.64 0.66 0.61   GLUCOSE mg/dL 141*  98 94 95 100* 103* 94   ALBUMIN g/dL 3.5 3.3* 3.3* 3.4* 2.9* 3.4* 3.4*   BILIRUBIN mg/dL 0.5 0.5 0.5 0.7 0.5 1.0 0.7   ALK PHOS U/L 84 93 85 94 71 90 88   AST (SGOT) U/L 17 31 31 28 19 31 47*   ALT (SGPT) U/L 23 27 20 19 14 19 21       BNP        TROPONIN  Results from last 7 days   Lab Units 03/31/23  0519   HSTROP T ng/L 1,016*       CoAg  Results from last 7 days   Lab Units 04/01/23  0518 03/31/23  0351 03/30/23  1144 03/30/23  0345 03/30/23  0026 03/29/23  1630 03/29/23  0524   INR   --   --   --   --  1.17*  --   --    APTT seconds 29.7* 29.2* 39.7* 36.0* 123.1* 28.2* 79.8*       Creatinine Clearance  Estimated Creatinine Clearance: 54.7 mL/min (by C-G formula based on SCr of 0.72 mg/dL).    ABG  Results from last 7 days   Lab Units 03/31/23  0345   PH, ARTERIAL pH units 7.390   PCO2, ARTERIAL mm Hg 39.7   PO2 ART mm Hg 81.5*   O2 SATURATION ART % 95.9   BASE EXCESS ART mmol/L -0.8*       Radiology  CT Chest Without Contrast Diagnostic    Result Date: 3/31/2023  Impression: 1. Findings consistent with interstitial pulmonary edema with small layering bilateral pleural effusions and mild passive bilateral lower lobe atelectasis. 2. Mild groundglass peribronchiolar nodular densities predominantly in the upper lobes may represent a component of small airways infectious/inflammatory process. 3. Dense coronary artery calcifications. Correlate with cardiac history. Electronically Signed: Genna Dixon  3/31/2023 1:17 PM EDT  Workstation ID: [id number]    XR Chest 1 View    Result Date: 3/31/2023  Interstitial fullness could reflect mild edema or chronic change. Electronically signed by:  Nico Sequeira M.D.  3/31/2023 2:07 AM [place] Time        EKG  I personally viewed and interpreted the patient's EKG/Telemetry data:  ECG 12 Lead Dyspnea   Final Result   HEART RATE= 81  bpm   RR Interval= 740  ms   IA Interval= 127  ms   P Horizontal Axis= -11  deg   P Front Axis= 41  deg   QRSD Interval= 81  ms   QT Interval= 343   ms   QRS Axis= 35  deg   T Wave Axis= 197  deg   - ABNORMAL ECG -   Sinus rhythm   Nonspecific repol abnormality, lateral leads   When compared with ECG of 27-Mar-2023 21:05:41,   No significant change   Electronically Signed By: Jose Colorado ([facility]) 31-Mar-2023 18:38:33   Date and Time of Study: 2023-03-31 03:46:39      ECG 12 Lead Chest Pain   Preliminary Result   HEART RATE= 75  bpm   RR Interval= 804  ms   LA Interval= 134  ms   P Horizontal Axis= -18  deg   P Front Axis= 44  deg   QRSD Interval= 94  ms   QT Interval= 420  ms   QRS Axis= 38  deg   T Wave Axis= 230  deg   - ABNORMAL ECG -   Sinus rhythm   Repol abnrm suggests ischemia, lateral leads   Electronically Signed By:    Date and Time of Study: 2023-03-27 21:05:41      ECG 12 Lead   Preliminary Result   HEART RATE= 76  bpm   RR Interval= 792  ms   LA Interval= 127  ms   P Horizontal Axis= 14  deg   P Front Axis= 66  deg   QRSD Interval= 79  ms   QT Interval= 384  ms   QRS Axis= 49  deg   T Wave Axis= 174  deg   - ABNORMAL ECG -   Sinus rhythm   Repol abnrm suggests ischemia, anterolateral   Electronically Signed By:    Date and Time of Study: 2023-03-26 04:39:55      ECG 12 Lead Chest Pain   Final Result   HEART RATE= 80  bpm   RR Interval= 748  ms   LA Interval= 131  ms   P Horizontal Axis= -2  deg   P Front Axis= 59  deg   QRSD Interval= 86  ms   QT Interval= 390  ms   QRS Axis= 57  deg   T Wave Axis= 140  deg   - ABNORMAL ECG -   Sinus rhythm   ST elevation, consider inferior injury   When compared with ECG of 25-Mar-2023 1:49:49,   ST changes are less prominent.   Electronically Signed By: Agustin Edwards ([facility]) 28-Mar-2023 18:53:22   Date and Time of Study: 2023-03-25 05:22:27      ECG 12 Lead Chest Pain   Final Result   HEART RATE= 82  bpm   RR Interval= 732  ms   LA Interval= 132  ms   P Horizontal Axis= -9  deg   P Front Axis= 67  deg   QRSD Interval= 83  ms   QT Interval= 379  ms   QRS Axis= 61  deg   T Wave Axis= 112  deg   - ABNORMAL ECG -    Sinus rhythm   Repol abnrm suggests ischemia, lateral leads   No previous ECG available for comparison   Electronically Signed By: Jacques Hu ([facility]) 25-Mar-2023 12:32:57   Date and Time of Study: 2023-03-25 01:41:24      ECG 12 Lead    (Results Pending)         Echocardiogram:    Results for orders placed during the hospital encounter of 03/25/23    Adult Transthoracic Echo Complete W/ Cont if Necessary Per Protocol    Interpretation Summary  •  Left ventricular systolic function is low normal. Left ventricular ejection fraction appears to be 51 - 55%.  •  Mildly reduced right ventricular systolic function noted.  •  Left atrial volume is mildly increased.  •  Moderate mitral valve regurgitation is present.  •  Estimated right ventricular systolic pressure from tricuspid regurgitation is normal (<35 mmHg).    Basal to mid inferior and inferolateral wall are hypokinetic, ischemic injury  Other segments contract normally  Overall EF appears 50%  Moderate MR eccentric, mild left atrial enlargement  Aortic valve is normal  IVC mildly enlarged with right atrial pressure estimated 10-15  No masses or effusions  RV mildly hypokinetic, normal size        Stress Test:         Cardiac Catheterization:  Results for orders placed during the hospital encounter of 03/25/23    Cardiac Catheterization/Vascular Study    Narrative   Nico Carrasco MD, PhD  [facility] cardiology  Date of service 3-    Procedure  1.  Left heart catheterization coronary angiography left ventriculography in ALEJANDRA position    Indication  Reevaluation of RCA intervention as well as subtotal circumflex status post STEMI, recurrent anginal chest pain, change in status    After informed consent patient brought to the Cath Lab sterilely prepped and draped in usual fashion with expose the right groin for right common femoral arterial access via micropuncture and modified Seldinger technique with placement of a 6 Brazilian sheath.  035  guidewire was advanced aortic valve followed by diagnostic JR4 and ultimately 6 Japanese EBU 3.5 guide for selective left and right coronary angiography respectively, JR4 was used to cross aortic valve followed by hand-injection for LV gram EDP assessment and pullback assessment of the transaortic valve gradient.  There are widely patent stents in the RCA with marked collateralization of the lateral wall retrogradely via well-developed collaterals in the AV groove for the continuation circumflex as well as retrogradely also with competitive flow to a large obtuse marginal branch.  LAD was widely patent with diffuse luminal regularities maximally 30%, IFR was deferred at this time, proximal circumflex had 99% mid lesion with again competitive flow into the AV groove continuation circumflex and competitive flow into the obtuse marginal branch which trifurcates along the lateral wall.  Decision was made for conservative approach given heavy collateralization of the lateral wall for staged intervention to be deferred to 4 to 6 weeks for circumflex and obtuse marginal.  6 Japanese Angio-Seal closure was performed with immediate hemostasis and maintenance of distal pulses.  She left Cath Lab chest pain-free hemodynamically electrically stable alert talkative staff neurologically grossly intact bilaterally    Complications none  Blood loss less than 5 cc  Contrast 65 cc  Sedation time of 30 minutes    Findings  1.  Opening aortic pressure of 112/76, closing pressure was unchanged  2.  LVEDP 10-12, LV systolic function grossly normal 55 to 60% with normal transaortic valve gradient    Angiography  1 left main absent dual ostia  2.  LAD proximal to mid 30%, diagonal small caliber vessel under 1.5 mm with 80% mid stenosis not amenable to intervention, medical therapy, LAD in the midportion and distal portion with diffuse luminal irregularities less than 30% with KRISTAL-3 flow throughout  3.  Circumflex is a large-caliber codominant  vessel with 99% long lesion in the midportion leading into the obtuse marginal branch, there is no flow into the AV groove continuation with competitive flow retrogradely from collateralized RCA into distal circumflex flow retrogradely, there is diminished competitive flow into the large obtuse marginal branch which also has competitive flow with collateralization from the RCA and PLV branches.  Over 4 RCA overlapping stents widely patent, heavy collateralization of the lateral wall retrogradely into the AV groove retrogradely as well as obtuse marginal branch, no disease the PLV and PDA branches    Conclusions recommendations  1.  Normal LVEF, recovered after inferior STEMI with revascularization, subtotal occlusion of the circumflex with competitive flow however heavily collateralized in retrograde fashion from the RCA, protected myocardial distribution of the circumflex at this time, nonobstructive disease LAD, normal filling pressures no evidence of heart failure    Staged PCI to the circumflex in 4 to 6 weeks after he has had a chance to feel depending on symptoms  Continue DAPT  High intensity statin therapy  Beta-blocker for goal heart rate 60-70, nitrates if needed  Fully recovered EF, ACE inhibitors if tolerated from a Ornade from hemodynamic standpoint for afterload reduction    Further recommendation follow findings and clinical course    Nico Carrasco MD, PhD         Other:         ASSESSMENT & PLAN:    Principal Problem:    ST elevation myocardial infarction (STEMI), unspecified artery        Coronary artery disease  Status post PCI of the RCA  Repeat catheterization showed total occlusion of the circumflex with robust collaterals from the RCA.  Continue with uninterrupted DAPT with aspirin and Brilinta for 12 months  Metoprolol XL  Imdur  High intensity statin  Cardiac rehab after discharge  Follow-up closely with Dr. Carrasco    Hyperlipidemia  On atorvastatin 80 mg    Hypertension   well-controlled on  losartan and metoprolol    COPD  Soon by pulmonology and given a dose of Solu-Medrol  Plan for PFTs outpatient    Okay to discharge from cardiac standpoint    Edwina Castro MD  04/01/23  14:18 EDT

## 2023-04-01 NOTE — PLAN OF CARE
Goal Outcome Evaluation:           Progress: improving  Outcome Evaluation: Patient remained on room air through out the night, SpO2 maintained >94% on RA. No c/o shortness or breath. VSS, urine output responsive to PO lasix (see I&O). Patient feels like she can go home today and is breathing much better.

## 2023-04-01 NOTE — PROGRESS NOTES
"Daily Progress Note        ST elevation myocardial infarction (STEMI), unspecified artery      Assessment       dyspnea with mild hypoxia secondary to pulmonary edema    Quit smoking 3 years ago with total of 25-pack-year history of smoking    COPD no PFTs    Findings of chronic bronchitis on CT chest      Plan       Lasix 20 mg p.o. daily   Patient has significant improvement in her symptoms    Currently on doxycycline for possible bronchitis    Patient received 1 dose of Solu-Medrol 125 mg   Steroids wean on 20  Mg of p.o. prednisone daily for 3 doses   p.r.n. inhaler    We will arrange for PFTs as an outpatient           LOS: 7 days     Subjective         Objective     Vital signs for last 24 hours:  Vitals:    04/01/23 0835 04/01/23 0900 04/01/23 0956 04/01/23 1020   BP:    122/60   BP Location:    Right arm   Patient Position:    Sitting   Pulse: 82  65 83   Resp:   16 16   Temp:       TempSrc:       SpO2: 97%  97% 97%   Weight:  60.1 kg (132 lb 7.9 oz)     Height:  144.8 cm (57.01\")         Intake/Output last 3 shifts:  I/O last 3 completed shifts:  In: 820 [P.O.:820]  Out: 1000 [Urine:1000]  Intake/Output this shift:  I/O this shift:  In: 240 [P.O.:240]  Out: 700 [Urine:700]      Radiology  Imaging Results (Last 24 Hours)     Procedure Component Value Units Date/Time    CT Chest Without Contrast Diagnostic [[id number]] Collected: 03/31/23 1313     Updated: 03/31/23 1319    Narrative:      CT CHEST WO CONTRAST DIAGNOSTIC    Date of Exam: 3/31/2023 12:27 PM EDT    Indication: soa and wheezing.    Comparison: AP portable chest 3/31/2023. CT chest 8/24/2021.    Technique: Axial CT images were obtained of the chest without contrast administration.  Sagittal and coronal reconstructions were performed.  Automated exposure control and iterative reconstruction methods were used.     Findings:  Smooth interstitial thickening is seen throughout both lungs, predominantly in a bibasilar distribution, in a pattern " consistent with interstitial pulmonary edema. Small layering bilateral pleural effusions are present, layering to a depth of 2.47 m on   the right. Passive atelectasis is demonstrated posteriorly in the bilateral lower lobes. No focal dense consolidative change is identified.    Benign calcified lymph nodes are present in the left hilum, and benign calcified nodules are scattered within the left upper lobe.    There are faint groundglass peribronchiolar nodular densities within the upper lobes which may represent small airways infectious/inflammatory process.    Heart size is normal. Dense coronary artery calcifications are present. Benign calcified mediastinal lymph nodes are present. There is no pericardial effusion. The thyroid gland is normal. There is mild calcific atherosclerosis within the thoracic aorta.    Cholecystectomy changes are present, and the imaged upper abdominal organs demonstrate a normal noncontrast appearance.    Degenerative endplate spurring is present within the thoracic spine. No acute or suspicious osseous abnormalities are identified.      Impression:      Impression:    1. Findings consistent with interstitial pulmonary edema with small layering bilateral pleural effusions and mild passive bilateral lower lobe atelectasis.  2. Mild groundglass peribronchiolar nodular densities predominantly in the upper lobes may represent a component of small airways infectious/inflammatory process.  3. Dense coronary artery calcifications. Correlate with cardiac history.    Electronically Signed: Genna Dixon    3/31/2023 1:17 PM EDT    Workstation ID: [id number]          Labs:  Results from last 7 days   Lab Units 04/01/23  0518   WBC 10*3/mm3 10.10   HEMOGLOBIN g/dL 10.1*   HEMATOCRIT % 31.7*   PLATELETS 10*3/mm3 171     Results from last 7 days   Lab Units 04/01/23  0518   SODIUM mmol/L 138   POTASSIUM mmol/L 4.0   CHLORIDE mmol/L 101   CO2 mmol/L 26.0   BUN mg/dL 10   CREATININE mg/dL 0.72    CALCIUM mg/dL 9.4   BILIRUBIN mg/dL 0.5   ALK PHOS U/L 84   ALT (SGPT) U/L 23   AST (SGOT) U/L 17   GLUCOSE mg/dL 141*     Results from last 7 days   Lab Units 03/31/23  0345   PH, ARTERIAL pH units 7.390   PO2 ART mm Hg 81.5*   PCO2, ARTERIAL mm Hg 39.7   HCO3 ART mmol/L 24.0     Results from last 7 days   Lab Units 04/01/23  0518 03/31/23  0351 03/30/23  0026   ALBUMIN g/dL 3.5 3.3* 3.3*     Results from last 7 days   Lab Units 03/31/23  0519 03/31/23  0351 03/28/23  0637   HSTROP T ng/L 1,016* 1,011* 983*         Results from last 7 days   Lab Units 04/01/23  0518   MAGNESIUM mg/dL 1.8     Results from last 7 days   Lab Units 04/01/23  0518 03/31/23  0351 03/30/23  1144 03/30/23  0345 03/30/23  0026   INR   --   --   --   --  1.17*   APTT seconds 29.7* 29.2* 39.7*   < > 123.1*    < > = values in this interval not displayed.               Meds:   SCHEDULE  albuterol sulfate HFA, 2 puff, Inhalation, 4x Daily - RT  aspirin, 81 mg, Oral, Daily  atorvastatin, 80 mg, Oral, Nightly  doxycycline, 100 mg, Oral, Q12H  losartan, 12.5 mg, Oral, Q24H  metoprolol succinate XL, 50 mg, Oral, Q24H  pantoprazole, 40 mg, Oral, Q AM  predniSONE, 20 mg, Oral, Daily   Followed by  [START ON 4/4/2023] predniSONE, 15 mg, Oral, Daily   Followed by  [START ON 4/7/2023] predniSONE, 10 mg, Oral, Daily   Followed by  [START ON 4/10/2023] predniSONE, 5 mg, Oral, Daily  ticagrelor, 90 mg, Oral, BID      Infusions     PRNs  •  acetaminophen  •  acetaminophen  •  albuterol  •  loperamide  •  magnesium sulfate **OR** magnesium sulfate **OR** magnesium sulfate  •  nitroglycerin  •  ondansetron  •  phenylephrine  •  potassium chloride  •  potassium chloride    Physical Exam:  Physical Exam  Cardiovascular:      Heart sounds: Murmur heard.     No friction rub. No gallop.   Pulmonary:      Breath sounds: No stridor. Rhonchi and rales present. No wheezing.   Chest:      Chest wall: Tenderness present.         ROS  Review of Systems   Respiratory:  Positive for cough and shortness of breath. Negative for wheezing and stridor.    Cardiovascular: Positive for palpitations and leg swelling. Negative for chest pain.             Total time spent with patient greater than: 45 Minutes

## 2023-04-01 NOTE — PLAN OF CARE
Goal Outcome Evaluation:  Plan of Care Reviewed With: patient crdiology at bedside and pt ok to dispo home, pt verbalizes understanding of dispo paperwork, waiting on wheelchair for transport. Pt was given angio seal card and stent identifications cards.

## 2023-04-01 NOTE — PLAN OF CARE
Goal Outcome Evaluation:pt a/ox4, waiting on cardiology to dispo pt home, pulmonary signed off, pt denies sob, or needs, call light in reach, clean and dry. Continues to have urine out put per lasix. States she wants to go home and feels much better.

## 2023-04-02 NOTE — OUTREACH NOTE
Prep Survey    Flowsheet Row Responses   [facility] patient discharged from? [facility]   Is LACE score < 7 ? No   Eligibility Readm Mgmt   Discharge diagnosis STEMI   Does the patient have one of the following disease processes/diagnoses(primary or secondary)? Acute MI (STEMI,NSTEMI)   Does the patient have Home health ordered? No   Is there a DME ordered? No   Prep survey completed? Yes          NUNU TERAN - Registered Nurse

## 2023-04-03 NOTE — CASE MANAGEMENT/SOCIAL WORK
Case Management Discharge Note      Transportation Services  Private: Car (assumed with family/friend)    Final Discharge Disposition Code: 01 - home or self-care

## 2023-04-05 ENCOUNTER — READMISSION MANAGEMENT (OUTPATIENT)
Dept: CALL CENTER | Facility: HOSPITAL | Age: 78
End: 2023-04-05
Payer: MEDICARE

## 2023-04-05 ENCOUNTER — TELEPHONE (OUTPATIENT)
Dept: CARDIAC REHAB | Facility: HOSPITAL | Age: 78
End: 2023-04-05
Payer: MEDICARE

## 2023-04-05 NOTE — TELEPHONE ENCOUNTER
Facesheet faxed to cardiac rehab requesting staff to contact patient for participation in their program.

## 2023-04-05 NOTE — OUTREACH NOTE
AMI Week 1 Survey    Flowsheet Row Responses   [facility] patient discharged from? [facility]   Does the patient have one of the following disease processes/diagnoses(primary or secondary)? Acute MI (STEMI,NSTEMI)   Week 1 attempt successful? Yes   Call start time 1208   Call end time 1211   Discharge diagnosis STEMI   Meds reviewed with patient/caregiver? Yes   Is the patient having any side effects they believe may be caused by any medication additions or changes? No   Does the patient have all prescriptions related to this admission filled (includes statins,anticoagulants,HTN meds,anti-arrhythmia meds) Yes   Is the patient taking all medications as directed (includes completed medication regime)? Yes   Does the patient have a primary care provider?  Yes   Does the patient have an appointment with their PCP,cardiologist,or clinic within 7 days of discharge? Yes   Has the patient kept scheduled appointments due by today? N/A   Has home health visited the patient within 72 hours of discharge? N/A   Psychosocial issues? No   Did the patient receive a copy of their discharge instructions? Yes   Nursing interventions Reviewed instructions with patient   What is the patient's perception of their health status since discharge? Improving   Nursing interventions Nurse provided patient education   Is the patient/caregiver able to teach back signs and symptoms of when to call for help immediately: Sudden chest discomfort, Sudden discomfort in arms, back, neck or jaw, Shortness of breath at any time, Nausea or vomiting, Dizziness or lightheadedness   Nursing interventions Nurse provided patient education   Is the pateint /caregiver able to teach back the importance of cardiac rehab? Yes   Nursing interventions Provided education on importance of cardiac rehab   Is the patient/caregiver able to teach back lifestyle changes to help prevent MIs Regular exercise as approved by provider   Is the patient/caregiver able to teach  back ways to prevent a second heart attack: Follow up with MD   If the patient is a current smoker, are they able to teach back resources for cessation? Not a smoker   Is the patient/caregiver able to teach back the hierarchy of who to call/visit for symptoms/problems? PCP, Specialist, Home health nurse, Urgent Care, ED, 911 Yes   Additional teach back comments She says she is doing better, she gets tired with exertion. Denies chest pain or soa.   Week 1 call completed? Yes   Is the patient interested in additional calls from an ambulatory ?  NOTE:  applies to high risk patients requiring additional follow-up. No   Wrap up additional comments No questions or issues at this time.          Katherine ROSENBERG - Registered Nurse

## 2023-04-12 ENCOUNTER — READMISSION MANAGEMENT (OUTPATIENT)
Dept: CALL CENTER | Facility: HOSPITAL | Age: 78
End: 2023-04-12
Payer: MEDICARE

## 2023-04-12 NOTE — OUTREACH NOTE
AMI Week 2 Survey    Flowsheet Row Responses   [facility] patient discharged from? [facility]   Does the patient have one of the following disease processes/diagnoses(primary or secondary)? Acute MI (STEMI,NSTEMI)   Week 2 attempt successful? Yes   Call start time 1404   Call end time 1407   Discharge diagnosis STEMI   Meds reviewed with patient/caregiver? Yes   Is the patient having any side effects they believe may be caused by any medication additions or changes? No   Does the patient have all prescriptions related to this admission filled (includes statins,anticoagulants,HTN meds,anti-arrhythmia meds) Yes   Is the patient taking all medications as directed (includes completed medication regime)? Yes   Does the patient have a primary care provider?  Yes   Has the patient kept scheduled appointments due by today? Yes   Psychosocial issues? No   Comments right groin site bruising has faded. Pt denies SOA/CP/dizziness and no further fatigue.She is currently outside planting a garden   What is the patient's perception of their health status since discharge? Returned to baseline/stable   Is the patient/caregiver able to teach back the hierarchy of who to call/visit for symptoms/problems? PCP, Specialist, Home health nurse, Urgent Care, ED, 911 Yes   Week 2 call completed? Yes   Revoked No further contact(revokes)-requires comment   Is the patient interested in additional calls from an ambulatory ?  NOTE:  applies to high risk patients requiring additional follow-up. Cece ROSENBERG - Registered Nurse

## 2023-04-14 ENCOUNTER — APPOINTMENT (OUTPATIENT)
Dept: GENERAL RADIOLOGY | Facility: HOSPITAL | Age: 78
End: 2023-04-14
Payer: MEDICARE

## 2023-04-14 ENCOUNTER — HOSPITAL ENCOUNTER (OUTPATIENT)
Facility: HOSPITAL | Age: 78
Setting detail: OBSERVATION
Discharge: HOME | End: 2023-04-15
Attending: INTERNAL MEDICINE | Admitting: FAMILY MEDICINE
Payer: MEDICARE

## 2023-04-14 DIAGNOSIS — R07.9 CHEST PAIN, UNSPECIFIED TYPE: Primary | ICD-10-CM

## 2023-04-14 LAB
ALBUMIN SERPL-MCNC: 4.1 G/DL (ref 3.5–5.2)
ALBUMIN/GLOB SERPL: 1.7 G/DL
ALP SERPL-CCNC: 81 U/L (ref 39–117)
ALT SERPL W P-5'-P-CCNC: 36 U/L (ref 1–33)
ANION GAP SERPL CALCULATED.3IONS-SCNC: 13 MMOL/L (ref 5–15)
AST SERPL-CCNC: 25 U/L (ref 1–32)
BASOPHILS # BLD MANUAL: 0.12 10*3/MM3 (ref 0–0.2)
BASOPHILS NFR BLD MANUAL: 1 % (ref 0–1.5)
BILIRUB SERPL-MCNC: 0.9 MG/DL (ref 0–1.2)
BUN SERPL-MCNC: 18 MG/DL (ref 8–23)
BUN/CREAT SERPL: 22 (ref 7–25)
CALCIUM SPEC-SCNC: 9.4 MG/DL (ref 8.6–10.5)
CHLORIDE SERPL-SCNC: 98 MMOL/L (ref 98–107)
CLUMPED PLATELETS: ABNORMAL
CO2 SERPL-SCNC: 26 MMOL/L (ref 22–29)
CREAT SERPL-MCNC: 0.82 MG/DL (ref 0.57–1)
DEPRECATED RDW RBC AUTO: 47.3 FL (ref 37–54)
EGFRCR SERPLBLD CKD-EPI 2021: 74.7 ML/MIN/1.73
EOSINOPHIL # BLD MANUAL: 0.24 10*3/MM3 (ref 0–0.4)
EOSINOPHIL NFR BLD MANUAL: 2 % (ref 0.3–6.2)
ERYTHROCYTE [DISTWIDTH] IN BLOOD BY AUTOMATED COUNT: 14.6 % (ref 12.3–15.4)
GEN 5 2HR TROPONIN T REFLEX: 51 NG/L
GLOBULIN UR ELPH-MCNC: 2.4 GM/DL
GLUCOSE SERPL-MCNC: 103 MG/DL (ref 65–99)
HCT VFR BLD AUTO: 36.9 % (ref 34–46.6)
HGB BLD-MCNC: 12 G/DL (ref 12–15.9)
HOLD SPECIMEN: NORMAL
HOLD SPECIMEN: NORMAL
IRON 24H UR-MRATE: 88 MCG/DL (ref 37–145)
IRON SATN MFR SERPL: 24 % (ref 20–50)
LIPASE SERPL-CCNC: 45 U/L (ref 13–60)
LYMPHOCYTES # BLD MANUAL: 2.36 10*3/MM3 (ref 0.7–3.1)
LYMPHOCYTES NFR BLD MANUAL: 6 % (ref 5–12)
MCH RBC QN AUTO: 29 PG (ref 26.6–33)
MCHC RBC AUTO-ENTMCNC: 32.5 G/DL (ref 31.5–35.7)
MCV RBC AUTO: 89.3 FL (ref 79–97)
MONOCYTES # BLD: 0.71 10*3/MM3 (ref 0.1–0.9)
NEUTROPHILS # BLD AUTO: 8.38 10*3/MM3 (ref 1.7–7)
NEUTROPHILS NFR BLD MANUAL: 70 % (ref 42.7–76)
NEUTS BAND NFR BLD MANUAL: 1 % (ref 0–5)
NT-PROBNP SERPL-MCNC: 1955 PG/ML (ref 0–1800)
PLATELET # BLD AUTO: 208 10*3/MM3 (ref 140–450)
PMV BLD AUTO: 9.6 FL (ref 6–12)
POTASSIUM SERPL-SCNC: 3.7 MMOL/L (ref 3.5–5.2)
PROT SERPL-MCNC: 6.5 G/DL (ref 6–8.5)
RBC # BLD AUTO: 4.13 10*6/MM3 (ref 3.77–5.28)
RBC MORPH BLD: NORMAL
SCAN SLIDE: NORMAL
SMALL PLATELETS BLD QL SMEAR: ADEQUATE
SODIUM SERPL-SCNC: 137 MMOL/L (ref 136–145)
TIBC SERPL-MCNC: 370 MCG/DL (ref 298–536)
TRANSFERRIN SERPL-MCNC: 248 MG/DL (ref 200–360)
TROPONIN T DELTA: 0 NG/L
TROPONIN T SERPL HS-MCNC: 51 NG/L
VARIANT LYMPHS NFR BLD MANUAL: 20 % (ref 19.6–45.3)
WBC MORPH BLD: NORMAL
WBC NRBC COR # BLD: 11.8 10*3/MM3 (ref 3.4–10.8)
WHOLE BLOOD HOLD COAG: NORMAL
WHOLE BLOOD HOLD SPECIMEN: NORMAL

## 2023-04-14 PROCEDURE — 99285 EMERGENCY DEPT VISIT HI MDM: CPT

## 2023-04-14 PROCEDURE — 96361 HYDRATE IV INFUSION ADD-ON: CPT

## 2023-04-14 PROCEDURE — G0378 HOSPITAL OBSERVATION PER HR: HCPCS

## 2023-04-14 PROCEDURE — 71045 X-RAY EXAM CHEST 1 VIEW: CPT

## 2023-04-14 PROCEDURE — 83690 ASSAY OF LIPASE: CPT | Performed by: NURSE PRACTITIONER

## 2023-04-14 PROCEDURE — 84484 ASSAY OF TROPONIN QUANT: CPT | Performed by: INTERNAL MEDICINE

## 2023-04-14 PROCEDURE — 84466 ASSAY OF TRANSFERRIN: CPT | Performed by: INTERNAL MEDICINE

## 2023-04-14 PROCEDURE — 80053 COMPREHEN METABOLIC PANEL: CPT | Performed by: NURSE PRACTITIONER

## 2023-04-14 PROCEDURE — 83540 ASSAY OF IRON: CPT | Performed by: INTERNAL MEDICINE

## 2023-04-14 PROCEDURE — 84484 ASSAY OF TROPONIN QUANT: CPT | Performed by: NURSE PRACTITIONER

## 2023-04-14 PROCEDURE — 85025 COMPLETE CBC W/AUTO DIFF WBC: CPT | Performed by: NURSE PRACTITIONER

## 2023-04-14 PROCEDURE — 93005 ELECTROCARDIOGRAM TRACING: CPT

## 2023-04-14 PROCEDURE — 93005 ELECTROCARDIOGRAM TRACING: CPT | Performed by: INTERNAL MEDICINE

## 2023-04-14 PROCEDURE — 83880 ASSAY OF NATRIURETIC PEPTIDE: CPT | Performed by: NURSE PRACTITIONER

## 2023-04-14 PROCEDURE — 85007 BL SMEAR W/DIFF WBC COUNT: CPT | Performed by: NURSE PRACTITIONER

## 2023-04-14 RX ORDER — NITROGLYCERIN 0.4 MG/1
0.4 TABLET SUBLINGUAL
Status: DISCONTINUED | OUTPATIENT
Start: 2023-04-14 | End: 2023-04-15 | Stop reason: HOSPADM

## 2023-04-14 RX ORDER — SODIUM CHLORIDE 0.9 % (FLUSH) 0.9 %
3-10 SYRINGE (ML) INJECTION AS NEEDED
Status: DISCONTINUED | OUTPATIENT
Start: 2023-04-14 | End: 2023-04-15 | Stop reason: HOSPADM

## 2023-04-14 RX ORDER — ATORVASTATIN CALCIUM 40 MG/1
80 TABLET, FILM COATED ORAL NIGHTLY
Status: DISCONTINUED | OUTPATIENT
Start: 2023-04-14 | End: 2023-04-15 | Stop reason: HOSPADM

## 2023-04-14 RX ORDER — SODIUM CHLORIDE 0.9 % (FLUSH) 0.9 %
10 SYRINGE (ML) INJECTION AS NEEDED
Status: DISCONTINUED | OUTPATIENT
Start: 2023-04-14 | End: 2023-04-15 | Stop reason: HOSPADM

## 2023-04-14 RX ORDER — PANTOPRAZOLE SODIUM 40 MG/1
40 TABLET, DELAYED RELEASE ORAL DAILY
Status: DISCONTINUED | OUTPATIENT
Start: 2023-04-15 | End: 2023-04-15 | Stop reason: HOSPADM

## 2023-04-14 RX ORDER — LOSARTAN POTASSIUM 25 MG/1
12.5 TABLET ORAL
Status: DISCONTINUED | OUTPATIENT
Start: 2023-04-15 | End: 2023-04-15 | Stop reason: HOSPADM

## 2023-04-14 RX ORDER — ONDANSETRON 2 MG/ML
4 INJECTION INTRAMUSCULAR; INTRAVENOUS EVERY 6 HOURS PRN
Status: DISCONTINUED | OUTPATIENT
Start: 2023-04-14 | End: 2023-04-15 | Stop reason: HOSPADM

## 2023-04-14 RX ORDER — ACETAMINOPHEN 325 MG/1
650 TABLET ORAL EVERY 4 HOURS PRN
Status: DISCONTINUED | OUTPATIENT
Start: 2023-04-14 | End: 2023-04-15 | Stop reason: HOSPADM

## 2023-04-14 RX ORDER — SODIUM CHLORIDE 0.9 % (FLUSH) 0.9 %
3 SYRINGE (ML) INJECTION EVERY 12 HOURS SCHEDULED
Status: DISCONTINUED | OUTPATIENT
Start: 2023-04-14 | End: 2023-04-15 | Stop reason: HOSPADM

## 2023-04-14 RX ORDER — ASPIRIN 81 MG/1
81 TABLET, CHEWABLE ORAL DAILY
Status: DISCONTINUED | OUTPATIENT
Start: 2023-04-15 | End: 2023-04-15 | Stop reason: HOSPADM

## 2023-04-14 RX ORDER — METOPROLOL SUCCINATE 50 MG/1
50 TABLET, EXTENDED RELEASE ORAL
Status: DISCONTINUED | OUTPATIENT
Start: 2023-04-15 | End: 2023-04-15 | Stop reason: HOSPADM

## 2023-04-14 RX ORDER — SODIUM CHLORIDE 9 MG/ML
40 INJECTION, SOLUTION INTRAVENOUS AS NEEDED
Status: DISCONTINUED | OUTPATIENT
Start: 2023-04-14 | End: 2023-04-15 | Stop reason: HOSPADM

## 2023-04-14 RX ORDER — SODIUM CHLORIDE 9 MG/ML
75 INJECTION, SOLUTION INTRAVENOUS CONTINUOUS
Status: DISCONTINUED | OUTPATIENT
Start: 2023-04-14 | End: 2023-04-15

## 2023-04-14 RX ADMIN — Medication 3 ML: at 22:28

## 2023-04-14 RX ADMIN — SODIUM CHLORIDE 75 ML/HR: 9 INJECTION, SOLUTION INTRAVENOUS at 17:21

## 2023-04-14 RX ADMIN — ATORVASTATIN CALCIUM 80 MG: 40 TABLET, FILM COATED ORAL at 22:25

## 2023-04-14 RX ADMIN — TICAGRELOR 90 MG: 90 TABLET ORAL at 22:28

## 2023-04-14 NOTE — ED NOTES
Pt. Presents via EMS from a [facility] with c/o feeling dizzy, soa, jaw pain, back pain and soreness to left chest. Soreness to chest has been ongoing since last admission to this hospital.

## 2023-04-14 NOTE — Clinical Note
Level of Care: Med/Surg [1]   Diagnosis: Chest pain, unspecified type [[id number]]   Admitting Physician: HOWIE WRIGHT [[id number]]   Attending Physician: HOWIE WRIGHT [[id number]]

## 2023-04-14 NOTE — ED PROVIDER NOTES
Subjective   History of Present Illness  77-year-old  female presents to the emergency room with complaint of sudden onset of chest pain that occurred while she was working outside in her yard and then occurred while she was at [employer] getting supplies to work in her yard.  She states she got dizzy got short of breath felt dizzy and had a sore achiness in her left chest.  Patient states that the pain radiates to the back between her shoulder blades and she also had some jaw pain and up the side of her neck.  Patient states that she got 2 stents last month and was diagnosed with pneumonia as well.        Review of Systems   Constitutional: Positive for activity change and fatigue.   HENT: Negative.    Eyes: Negative.    Respiratory: Positive for shortness of breath.    Cardiovascular: Positive for chest pain.   Gastrointestinal: Positive for nausea. Negative for diarrhea and vomiting.   Endocrine: Negative.    Genitourinary: Negative.    Musculoskeletal: Positive for back pain.   Skin: Negative.    Neurological: Positive for dizziness.   Hematological: Negative.    Psychiatric/Behavioral: Negative.        No past medical history on file.    No Known Allergies    Past Surgical History:   Procedure Laterality Date   • CARDIAC CATHETERIZATION N/A 3/25/2023    Procedure: Left Heart Cath;  Surgeon: Nico Carrasco MD;  Location: [facility] CATH INVASIVE LOCATION;  Service: Cardiology;  Laterality: N/A;   • CARDIAC CATHETERIZATION N/A 3/30/2023    Procedure: Left Heart Cath;  Surgeon: Nico Carrasco MD;  Location: [facility] CATH INVASIVE LOCATION;  Service: Cardiology;  Laterality: N/A;       Family History   Problem Relation Age of Onset   • No Known Problems Mother    • No Known Problems Father        Social History     Socioeconomic History   • Marital status:    Tobacco Use   • Smoking status: Former     Types: Cigarettes     Quit date: 3/13/2020     Years since quitting: 3.0   •  Smokeless tobacco: Never   Vaping Use   • Vaping Use: Never used   Substance and Sexual Activity   • Alcohol use: Yes     Comment: occasional   • Drug use: Never   • Sexual activity: Not Currently           Objective   Physical Exam  Vitals and nursing note reviewed.   Constitutional:       General: She is not in acute distress.     Appearance: She is not ill-appearing, toxic-appearing or diaphoretic.   HENT:      Head: Normocephalic and atraumatic.   Eyes:      Extraocular Movements: Extraocular movements intact.      Pupils: Pupils are equal, round, and reactive to light.   Cardiovascular:      Rate and Rhythm: Normal rate.      Pulses:           Radial pulses are 1+ on the right side and 2+ on the left side.      Heart sounds: Normal heart sounds.   Pulmonary:      Effort: Pulmonary effort is normal.      Breath sounds: Normal breath sounds.   Abdominal:      Palpations: Abdomen is soft.   Musculoskeletal:         General: Normal range of motion.      Cervical back: Normal range of motion and neck supple.      Right lower leg: No tenderness. No edema.      Left lower leg: No tenderness. No edema.   Skin:     General: Skin is warm and dry.      Capillary Refill: Capillary refill takes less than 2 seconds.   Neurological:      General: No focal deficit present.      Mental Status: She is alert and oriented to person, place, and time.   Psychiatric:         Mood and Affect: Mood normal.         Behavior: Behavior normal.         Procedures           ED Course      Labs Reviewed   COMPREHENSIVE METABOLIC PANEL - Abnormal; Notable for the following components:       Result Value    Glucose 103 (*)     ALT (SGPT) 36 (*)     All other components within normal limits    Narrative:     GFR Normal >60  Chronic Kidney Disease <60  Kidney Failure <15    The GFR formula is only valid for adults with stable renal function between ages 18 and 70.   BNP (IN-HOUSE) - Abnormal; Notable for the following components:    proBNP  1,955.0 (*)     All other components within normal limits    Narrative:     Among patients with dyspnea, NT-proBNP is highly sensitive for the detection of acute congestive heart failure. In addition NT-proBNP of <300 pg/ml effectively rules out acute congestive heart failure with 99% negative predictive value.    Results may be falsely decreased if patient taking Biotin.     TROPONIN - Abnormal; Notable for the following components:    HS Troponin T 51 (*)     All other components within normal limits    Narrative:     High Sensitive Troponin T Reference Range:  <10.0 ng/L- Negative Female for AMI  <15.0 ng/L- Negative Male for AMI  >=10 - Abnormal Female indicating possible myocardial injury.  >=15 - Abnormal Male indicating possible myocardial injury.   Clinicians would have to utilize clinical acumen, EKG, Troponin, and serial changes to determine if it is an Acute Myocardial Infarction or myocardial injury due to an underlying chronic condition.        LIPASE - Normal   RAINBOW DRAW    Narrative:     The following orders were created for panel order [facility] Draw.  Procedure                               Abnormality         Status                     ---------                               -----------         ------                     Green Top (Gel)[[id number]]                                  Final result               Lavender Top[[id number]]                                     Final result               Gold Top - SST[[id number]]                                   Final result               Light Blue Top[[id number]]                                   Final result                 Please view results for these tests on the individual orders.   CBC WITH AUTO DIFFERENTIAL   HIGH SENSITIVITIY TROPONIN T 2HR   GREEN TOP   LAVENDER TOP   GOLD TOP - SST   LIGHT BLUE TOP   CBC AND DIFFERENTIAL    Narrative:     The following orders were created for panel order CBC & Differential.  Procedure                                Abnormality         Status                     ---------                               -----------         ------                     CBC Auto Differential[[id number]]                                                       Scan Slide[[id number]]                                                                    Please view results for these tests on the individual orders.     Troponin is elevated at 49 then delta is over 50, will admit for further cardiac workup and possible stress test.  XR Chest 1 View    Result Date: 4/14/2023  Impression: No acute cardiopulmonary findings. Electronically Signed: Petey Puentes  4/14/2023 4:21 PM EDT  Workstation ID: [id number]      Medications   sodium chloride 0.9 % flush 10 mL (has no administration in time range)                   HEART Score: 7                      MDM  Patient case discussed with Dr Wright and agrees to accept for continued chest pain workup.  Final diagnoses:   Chest pain, unspecified type       ED Disposition  ED Disposition     ED Disposition   Decision to Admit    Condition   --    Comment   Level of Care: Telemetry [5]   Admitting Physician: HOWIE WRIGHT [[id number]]   Attending Physician: HOWIE WRIGHT [[id number]]               No follow-up provider specified.       Medication List      No changes were made to your prescriptions during this visit.          Mila Samano, APRN  04/15/23 4004

## 2023-04-15 VITALS
OXYGEN SATURATION: 98 % | RESPIRATION RATE: 13 BRPM | SYSTOLIC BLOOD PRESSURE: 127 MMHG | TEMPERATURE: 97.9 F | WEIGHT: 124.8 LBS | BODY MASS INDEX: 26.93 KG/M2 | HEIGHT: 57 IN | DIASTOLIC BLOOD PRESSURE: 61 MMHG | HEART RATE: 82 BPM

## 2023-04-15 LAB
TROPONIN T SERPL HS-MCNC: 49 NG/L
WHOLE BLOOD HOLD COAG: NORMAL
WHOLE BLOOD HOLD SPECIMEN: NORMAL

## 2023-04-15 PROCEDURE — 36415 COLL VENOUS BLD VENIPUNCTURE: CPT | Performed by: INTERNAL MEDICINE

## 2023-04-15 PROCEDURE — 96374 THER/PROPH/DIAG INJ IV PUSH: CPT

## 2023-04-15 PROCEDURE — G0378 HOSPITAL OBSERVATION PER HR: HCPCS

## 2023-04-15 PROCEDURE — 25010000002 METHYLPREDNISOLONE PER 40 MG: Performed by: FAMILY MEDICINE

## 2023-04-15 PROCEDURE — 84484 ASSAY OF TROPONIN QUANT: CPT | Performed by: INTERNAL MEDICINE

## 2023-04-15 RX ORDER — METHYLPREDNISOLONE SODIUM SUCCINATE 40 MG/ML
20 INJECTION, POWDER, LYOPHILIZED, FOR SOLUTION INTRAMUSCULAR; INTRAVENOUS ONCE
Status: COMPLETED | OUTPATIENT
Start: 2023-04-15 | End: 2023-04-15

## 2023-04-15 RX ORDER — ISOSORBIDE MONONITRATE 30 MG/1
30 TABLET, EXTENDED RELEASE ORAL
Qty: 30 TABLET | Refills: 1 | Status: SHIPPED | OUTPATIENT
Start: 2023-04-16

## 2023-04-15 RX ORDER — ISOSORBIDE MONONITRATE 30 MG/1
30 TABLET, EXTENDED RELEASE ORAL
Status: DISCONTINUED | OUTPATIENT
Start: 2023-04-15 | End: 2023-04-15 | Stop reason: HOSPADM

## 2023-04-15 RX ORDER — IPRATROPIUM BROMIDE AND ALBUTEROL SULFATE 2.5; .5 MG/3ML; MG/3ML
3 SOLUTION RESPIRATORY (INHALATION) EVERY 6 HOURS PRN
Status: DISCONTINUED | OUTPATIENT
Start: 2023-04-15 | End: 2023-04-15 | Stop reason: HOSPADM

## 2023-04-15 RX ADMIN — ASPIRIN 81 MG: 81 TABLET, CHEWABLE ORAL at 10:11

## 2023-04-15 RX ADMIN — SODIUM CHLORIDE 75 ML/HR: 9 INJECTION, SOLUTION INTRAVENOUS at 10:17

## 2023-04-15 RX ADMIN — TICAGRELOR 90 MG: 90 TABLET ORAL at 10:11

## 2023-04-15 RX ADMIN — METHYLPREDNISOLONE SODIUM SUCCINATE 20 MG: 40 INJECTION, POWDER, FOR SOLUTION INTRAMUSCULAR; INTRAVENOUS at 10:11

## 2023-04-15 RX ADMIN — PANTOPRAZOLE SODIUM 40 MG: 40 TABLET, DELAYED RELEASE ORAL at 10:10

## 2023-04-15 RX ADMIN — LOSARTAN POTASSIUM 12.5 MG: 25 TABLET, FILM COATED ORAL at 10:10

## 2023-04-15 RX ADMIN — ISOSORBIDE MONONITRATE 30 MG: 30 TABLET, EXTENDED RELEASE ORAL at 14:28

## 2023-04-15 RX ADMIN — Medication 3 ML: at 10:12

## 2023-04-15 RX ADMIN — METOPROLOL SUCCINATE 50 MG: 50 TABLET, EXTENDED RELEASE ORAL at 10:10

## 2023-04-15 NOTE — H&P
Patient Care Team:  Fermin Payton MD as PCP - General (Family Medicine)    Chief Conplaint  Subjective     The patient is a 77 y.o. female presents with acute onset of some anterior chest wall pain after spending a day working in the garden    HPI  77-year-old female with known history of percutaneous coronary intervention 2 weeks prior to presentation presented with acute onset of some anterior chest wall pain after having worked in her garden.  She presented to the [facility] emergency room for evaluation where she was admitted although I can find no documentation from the ER.  She related some anterior chest wall discomfort which is reproducible.  Review of Systems  Review of Systems   Constitutional: Positive for activity change.   Respiratory: Negative.    Cardiovascular: Positive for chest pain.       History  History reviewed. No pertinent past medical history.  Past Surgical History:   Procedure Laterality Date   • CARDIAC CATHETERIZATION N/A 3/25/2023    Procedure: Left Heart Cath;  Surgeon: Nico Carrasco MD;  Location: [facility] CATH INVASIVE LOCATION;  Service: Cardiology;  Laterality: N/A;   • CARDIAC CATHETERIZATION N/A 3/30/2023    Procedure: Left Heart Cath;  Surgeon: Nico Carrasco MD;  Location: [facility] CATH INVASIVE LOCATION;  Service: Cardiology;  Laterality: N/A;     Family History   Problem Relation Age of Onset   • No Known Problems Mother    • No Known Problems Father      Social History     Tobacco Use   • Smoking status: Former     Types: Cigarettes     Quit date: 3/13/2020     Years since quitting: 3.0   • Smokeless tobacco: Never   Vaping Use   • Vaping Use: Never used   Substance Use Topics   • Alcohol use: Yes     Comment: occasional   • Drug use: Never     Allergies:  Patient has no known allergies.    Objective     Vital Signs  Temp:  [97.1 °F (36.2 °C)-98.2 °F (36.8 °C)] 98.2 °F (36.8 °C)  Heart Rate:  [59-73] 59  Resp:  [13-20] 15  BP: ()/(42-69)  113/69      Physical Exam:   Physical Exam  Vitals and nursing note reviewed.   Cardiovascular:      Rate and Rhythm: Normal rate.   Skin:     General: Skin is warm.              Results Review:   CBC    Results from last 7 days   Lab Units 04/14/23  1728   WBC 10*3/mm3 11.80*   HEMOGLOBIN g/dL 12.0   PLATELETS 10*3/mm3 208     BMP   Results from last 7 days   Lab Units 04/14/23  1534   SODIUM mmol/L 137   POTASSIUM mmol/L 3.7   CHLORIDE mmol/L 98   CO2 mmol/L 26.0   BUN mg/dL 18   CREATININE mg/dL 0.82   GLUCOSE mg/dL 103*     Cr Clearance Estimated Creatinine Clearance: 46.7 mL/min (by C-G formula based on SCr of 0.82 mg/dL).  Coag     HbA1C   Lab Results   Component Value Date    HGBA1C 5.70 (H) 03/25/2023     Blood Glucose No results found for: POCGLU  Infection     CMP   Results from last 7 days   Lab Units 04/14/23  1534   SODIUM mmol/L 137   POTASSIUM mmol/L 3.7   CHLORIDE mmol/L 98   CO2 mmol/L 26.0   BUN mg/dL 18   CREATININE mg/dL 0.82   GLUCOSE mg/dL 103*   ALBUMIN g/dL 4.1   BILIRUBIN mg/dL 0.9   ALK PHOS U/L 81   AST (SGOT) U/L 25   ALT (SGPT) U/L 36*   LIPASE U/L 45     UA      Radiology(recent) XR Chest 1 View    Result Date: 4/14/2023  Impression: No acute cardiopulmonary findings. 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     Art PIEDRA  MD Destiny  04/15/23  09:45 EDT

## 2023-04-15 NOTE — CONSULTS
Cardiology [place]        Subjective:     Encounter Date:04/14/2023      Patient ID: Finn Vyas is a 77 y.o. female.    Chief Complaint: chest pain    Referring Physician: Dr. Dixon    HPI: Agree with narrative is discussed with nurse practitioner after face-to-face encounter scribed findings below  Finn Vyas is a 77 y.o. female who presents with chest pain. Ms. Vyas is a patient of Dr. Carrasco. PMh includes CAD, thrombocytopenia which is monitored, former smoker.     She had recent STEMI 3/25/2023 s/p Percutaneous coronary intervention to the RCA with overlapping 2.25 x 38 and 3.0 x 28 Xience drug-eluting stents postdilated 3.0 mm at 22 jossie with great angiographic results, 100% stenosis reduced to 0% residual. Patient had repeat cardiac cath 3/30/2023 during same hospitalization secondary to continued chest pain.  LHC revealed Normal LVEF, recovered after inferior STEMI with revascularization, subtotal occlusion of the circumflex with competitive flow however heavily collateralized in retrograde fashion from the RCA, protected myocardial distribution of the circumflex at this time, nonobstructive disease LAD, normal filling pressures no evidence of heart failure  The plan was for staged PCI to the circumflex in 4 to 6 weeks.    Patient presented to ER after experiencing chest discomfort while at [employer] getting mulch. She reports lightheadedness and dizziness during the event, she felt hot and flushed and had to go to the car to turn on the air. Work up revealed HS troponin 51, 51, 49. (downtrending from HS troponin 1,016 at end of March)  ProBNP 1,955. CXR showed no acute findings.   Patient reports chest pain has subsided. She has ambulated around the room.     Well-known to me, troponins are negative, angina has subsided, she has a known critical lesion in the circumflex with collateralization from the RCA with chronic stable CCS class II-III angina, we are waiting 2 to 4 weeks after prior  attempts at revascularization for lesion to stabilize with prior subintimal wire course and aborted procedure and full revascularization of the RCA at the time of STEMI.  LAD is widely patent.  EF is normal with no heart failure    As discussed the patient we will bring her back in 2 to 4 weeks for staged intervention of the circumflex, she is continue aspirin and Brilinta, antiplatelet therapy, antianginals, nitrates, avoid strenuous activity which would provoke angina, continue beta-blockade    Review of systems otherwise x14 point review of systems except as mentioned above  Historical data copied forward from previous encounters in EMR is unchanged      History reviewed. No pertinent past medical history.    Past Surgical History:   Procedure Laterality Date   • CARDIAC CATHETERIZATION N/A 3/25/2023    Procedure: Left Heart Cath;  Surgeon: Nico Carrasco MD;  Location:  [facility] INVASIVE [facility];  Service: Cardiology;  Laterality: N/A;   • CARDIAC CATHETERIZATION N/A 3/30/2023    Procedure: Left Heart Cath;  Surgeon: Nico Carrasco MD;  Location: [facility] CATH INVASIVE LOCATION;  Service: Cardiology;  Laterality: N/A;       Family History   Problem Relation Age of Onset   • No Known Problems Mother    • No Known Problems Father        Social History     Socioeconomic History   • Marital status:    Tobacco Use   • Smoking status: Former     Types: Cigarettes     Quit date: 3/13/2020     Years since quitting: 3.0   • Smokeless tobacco: Never   Vaping Use   • Vaping Use: Never used   Substance and Sexual Activity   • Alcohol use: Yes     Comment: occasional   • Drug use: Never   • Sexual activity: Not Currently         No Known Allergies    Current Medications:   Scheduled Meds:aspirin, 81 mg, Oral, Daily  atorvastatin, 80 mg, Oral, Nightly  losartan, 12.5 mg, Oral, Q24H  metoprolol succinate XL, 50 mg, Oral, Q24H  pantoprazole, 40 mg, Oral, Daily  sodium chloride, 3 mL, Intravenous,  "Q12H  ticagrelor, 90 mg, Oral, BID      Continuous Infusions:sodium chloride, 75 mL/hr, Last Rate: 75 mL/hr (04/15/23 1017)        Review of Systems   Constitutional: Negative for chills, diaphoresis and malaise/fatigue.   Cardiovascular: Positive for chest pain. Negative for dyspnea on exertion, irregular heartbeat, leg swelling, near-syncope, orthopnea, palpitations, paroxysmal nocturnal dyspnea and syncope.   Respiratory: Positive for shortness of breath. Negative for cough, sleep disturbances due to breathing and sputum production.    Gastrointestinal: Positive for nausea. Negative for change in bowel habit.   Genitourinary: Negative for urgency.   Neurological: Negative for dizziness and headaches.   Psychiatric/Behavioral: Negative for altered mental status.          Objective:         /69 (BP Location: Right arm, Patient Position: Lying)   Pulse 59   Temp 98.2 °F (36.8 °C) (Oral)   Resp 15   Ht 144.8 cm (57\")   Wt 56.6 kg (124 lb 12.8 oz)   SpO2 97%   BMI 27.01 kg/m²     Physical Exam:  General Appearance:    Alert, cooperative, in no acute distress                                Head: Atraumatic, normocephalic, PERRLA               Neck:   supple, trachea midline, no thyromegaly, no carotid bruit, no JVD   Lungs:     Clear to auscultation,respirations regular, even and               unlabored    Heart:    Regular rhythm and normal rate, normal S1 and S2   Abdomen:     Normal bowel sounds, no masses, no organomegaly, soft  nontender, nondistended, no guarding, no rebound  tenderness   Extremities:   Moves all extremities well, no edema, no cyanosis, no  redness   Pulses:   Pulses palpable and equal bilaterally   Skin:   No bleeding, bruising or rash   Neurologic:   Awake, alert, oriented x3     Agree with exam as discussed with nurse practitioner after face-to-face encounter scribed findings above            ASCVD Risk Score::  The ASCVD Risk score (Tanner DK, et al., 2019) failed to calculate " for the following reasons:    The patient has a prior MI or stroke diagnosis      Lab Review:     Results from last 7 days   Lab Units 04/14/23  1534   SODIUM mmol/L 137   POTASSIUM mmol/L 3.7   CHLORIDE mmol/L 98   CO2 mmol/L 26.0   BUN mg/dL 18   CREATININE mg/dL 0.82   GLUCOSE mg/dL 103*   CALCIUM mg/dL 9.4   AST (SGOT) U/L 25   ALT (SGPT) U/L 36*     Results from last 7 days   Lab Units 04/15/23  0756 04/14/23  1728 04/14/23  1534   HSTROP T ng/L 49* 51* 51*     Results from last 7 days   Lab Units 04/14/23  1728   WBC 10*3/mm3 11.80*   HEMOGLOBIN g/dL 12.0   HEMATOCRIT % 36.9   PLATELETS 10*3/mm3 208                   Invalid input(s): LDLCALC  Results from last 7 days   Lab Units 04/14/23  1534   PROBNP pg/mL 1,955.0*           Recent Radiology:  Imaging Results (Most Recent)     Procedure Component Value Units Date/Time    XR Chest 1 View [[id number]] Collected: 04/14/23 1619     Updated: 04/14/23 1623    Narrative:      XR CHEST 1 VW    Date of Exam: 4/14/2023 4:05 PM EDT    Indication: chest pain.    Comparison: 3/31/2023    Findings:  No focal pulmonary opacities are seen. Pulmonary vascularity is within normal limits. Heart size and mediastinal contour appear within normal limits. No pleural fluid is seen. No pneumothorax is evident. There is degenerative change in the shoulders and   spine. Aortic vascular calcification is noted.      Impression:      Impression:  No acute cardiopulmonary findings.    Electronically Signed: Petey Puentes    4/14/2023 4:21 PM EDT    Workstation ID: [id number]            ECHOCARDIOGRAM:    Results for orders placed during the hospital encounter of 03/25/23    Adult Transthoracic Echo Complete W/ Cont if Necessary Per Protocol    Interpretation Summary  •  Left ventricular systolic function is low normal. Left ventricular ejection fraction appears to be 51 - 55%.  •  Mildly reduced right ventricular systolic function noted.  •  Left atrial volume is mildly increased.  •   Moderate mitral valve regurgitation is present.  •  Estimated right ventricular systolic pressure from tricuspid regurgitation is normal (<35 mmHg).    Basal to mid inferior and inferolateral wall are hypokinetic, ischemic injury  Other segments contract normally  Overall EF appears 50%  Moderate MR eccentric, mild left atrial enlargement  Aortic valve is normal  IVC mildly enlarged with right atrial pressure estimated 10-15  No masses or effusions  RV mildly hypokinetic, normal size                  Assessment:         Active Hospital Problems    Diagnosis  POA   • **Chest pain, unspecified type [R07.9]  Yes     1. Chest Pain  - troponin overall downtrending from STEMI end of March  - currently pain free    2. 1. STEMI  - s/p [facility] 3/25/2023: Percutaneous coronary intervention to the RCA with overlapping 2.25 x 38 and 3.0 x 28 Xience drug-eluting stents postdilated 3.0 mm at 22 jossie with great angiographic results, 100% stenosis reduced to 0% residual  -repeat [facility] 3/30/2023: subtotal occlusion of the circumflex with competitive flow however heavily collateralized in retrograde fashion from the RCA, protected myocardial distribution of the circumflex at this time, nonobstructive disease LAD, normal filling pressures no evidence of heart failure  The plan was for staged PCI to the circumflex in 4 to 6 weeks.  - DAPT with ASA / Brilinta   - continue statin therapy     3. Thrombocytopenia- continue to monitor-- seems to have improved from last admission     4. Moderate MR, LVEF 50%        Plan:   Ms. Vyas is chest pain free  Today, as discussed with patient, she may discharge and we will see her in clinic to discuss timing of staged procedure in 2 weeks at her scheduled appointment coming up.  Continue DAPT With ASA / Brilinta  Will start imdur long-acting nitrate  Tolerating toprol XL and losartan  Patient should limit exertional activity until seen in cardiology office      Nico Carrasco MD, PhD               Jeannette  Adilson, PRECIOUS  04/15/23  12:17 EDT

## 2023-04-15 NOTE — PLAN OF CARE
Goal Outcome Evaluation:  Plan of Care Reviewed With: patient        Progress: no change  Outcome Evaluation: Pt rested well throughout the night.Educate patient to notify nusring stuff for increasing chest pain or changes of chest pain. Pt is on room air. Vital stable. Keep NPO status. No other concern at this time. Will continue to monitor

## 2023-04-17 LAB — QT INTERVAL: 391 MS

## 2023-04-17 NOTE — CASE MANAGEMENT/SOCIAL WORK
Case Management Discharge Note      Final Note: Routine home                 Transportation Services  Private: Car    Final Discharge Disposition Code: 01 - home or self-care

## 2023-04-19 LAB
QT INTERVAL: 384 MS
QT INTERVAL: 420 MS

## 2023-04-21 NOTE — DISCHARGE SUMMARY
Date of Discharge:  4/21/2023    Discharge Diagnosis:     Anterior chest wall pain  Atherosclerotic heart disease of native coronaries with angina pectoris  Elevated troponins  History of recent ST elevation myocardial infarction  History recent percutaneous coronary intervention with stent placement to the RCA  Dyslipidemia  Panlobular COPD with emphysema  Hypertension with chronic kidney disease stage II  Chronic kidney disease stage II    Presenting Problem/History of Present Illness  Active Hospital Problems    Diagnosis  POA   • **Chest pain, unspecified type [R07.9]  Yes      Resolved Hospital Problems   No resolved problems to display.          Hospital Course  Patient is a 77 y.o. female who presented with substernal chest pain.  She was admitted and evaluated by cardiology and her recent hospitalization was reviewed.  She was found via history to have an NSTEMI in the end of March of this year and found to have overall troponins which were trending downwards from her initial cardiac event.  She was treated with steroids and related to complete elimination of her discomfort and after cardiology.  Here for discharge she was deemed stable for discharge home today with medications per the discharge reconciliation.  To have close outpatient follow-up with cardiology within 2 weeks and with us within 1 week's time.  She was told to limit her exertional activity until she was evaluated by cardiology.  She will be able to call us at anytime with any questions or concerns and if she decompensates she is to proceed to the emergency room at once    Procedures Performed         Consults:   Consults     Date and Time Order Name Status Description    4/14/2023  5:02 PM Inpatient Cardiology Consult Completed     3/31/2023 10:17 AM Inpatient Pulmonology Consult Completed     3/25/2023  4:52 AM Inpatient Intensivist Consult Completed     3/25/2023  4:52 AM Inpatient Cardiothoracic Surgery Consult Completed            Pertinent Test Results:XR Chest 1 View    Result Date: 4/14/2023  Impression: No acute cardiopulmonary findings. Electronically Signed: Petey Puentes  4/14/2023 4:21 PM EDT  Workstation ID: [id number]      Imaging Results (Last 7 Days)     Procedure Component Value Units Date/Time    XR Chest 1 View [[id number]] Collected: 04/14/23 1619     Updated: 04/14/23 1623    Narrative:      XR CHEST 1 VW    Date of Exam: 4/14/2023 4:05 PM EDT    Indication: chest pain.    Comparison: 3/31/2023    Findings:  No focal pulmonary opacities are seen. Pulmonary vascularity is within normal limits. Heart size and mediastinal contour appear within normal limits. No pleural fluid is seen. No pneumothorax is evident. There is degenerative change in the shoulders and   spine. Aortic vascular calcification is noted.      Impression:      Impression:  No acute cardiopulmonary findings.    Electronically Signed: Petey Puentes    4/14/2023 4:21 PM EDT    Workstation ID: [id number]              Condition on Discharge:  Fair    Vital Signs       Physical Exam:     General Appearance:    Alert, cooperative, in no acute distress   Head:    Normocephalic, without obvious abnormality, atraumatic   Eyes:           Conjunctivae and sclerae normal, no   icterus, no pallor, corneas clear, PERRLA   Throat:   No oral lesions, no thrush, oral mucosa moist   Neck:   No adenopathy, supple, trachea midline, no thyromegaly, no   carotid bruit, no JVD   Lungs:     Clear to auscultation,respirations regular, even and                  unlabored    Heart:    Regular rhythm and normal rate, normal S1 and S2, no            murmur, no gallop, no rub, no click   Chest Wall:    No abnormalities observed   Abdomen:     Normal bowel sounds, no masses, no organomegaly, soft        non-tender, non-distended, no guarding, no rebound                tenderness   Rectal:     Deferred   Extremities:   Moves all extremities well, no edema, no cyanosis, no             redness    Pulses:   Pulses palpable and equal bilaterally   Skin:   No bleeding, bruising or rash   Lymph nodes:   No palpable adenopathy   Neurologic:   Cranial nerves 2 - 12 grossly intact, sensation intact, DTR       present and equal bilaterally         Discharge Disposition  Home or Self Care    Discharge Medications     Discharge Medications      New Medications      Instructions Start Date   isosorbide mononitrate 30 MG 24 hr tablet  Commonly known as: IMDUR   30 mg, Oral, Every 24 Hours Scheduled         Continue These Medications      Instructions Start Date   albuterol sulfate  (90 Base) MCG/ACT inhaler  Commonly known as: PROVENTIL HFA;VENTOLIN HFA;PROAIR HFA   2 puffs, Inhalation, 4 Times Daily - RT      Aspirin Low Dose 81 MG chewable tablet  Generic drug: aspirin   81 mg, Oral, Daily      atorvastatin 80 MG tablet  Commonly known as: LIPITOR   80 mg, Oral, Nightly      Brilinta 90 MG tablet tablet  Generic drug: ticagrelor   90 mg, Oral, 2 Times Daily      losartan 25 MG tablet  Commonly known as: COZAAR   12.5 mg, Oral, Every 24 Hours Scheduled      metoprolol succinate XL 50 MG 24 hr tablet  Commonly known as: TOPROL-XL   50 mg, Oral, Every 24 Hours Scheduled      nitroglycerin 0.4 MG SL tablet  Commonly known as: NITROSTAT   0.4 mg, Sublingual, Every 5 Minutes PRN, Take no more than 3 doses in 15 minutes.      pantoprazole 40 MG EC tablet  Commonly known as: PROTONIX   40 mg, Oral, Every Early Morning             Discharge Diet:   Cardiac  Activity at Discharge:   As tolerated  Follow-up Appointments  Follow-up with cardiology in 1 month  Follow-up with us within 1 to 2 weeks  Future Appointments   Date Time Provider Department Center   5/1/2023  1:30 PM Inga Iglesias APRN [facility]         Test Results Pending at Discharge       Art Dixon MD  04/21/23  10:00 EDT

## 2023-05-02 ENCOUNTER — TELEPHONE (OUTPATIENT)
Dept: CARDIOLOGY | Facility: CLINIC | Age: 78
End: 2023-05-02
Payer: MEDICARE

## 2023-05-02 NOTE — TELEPHONE ENCOUNTER
Patient stated that she needs a refill on Brilinta but she stated that its $500. I let her know we have samples to  she stated that she can't get here to pick them up she doesn't have gas money. She asked if there was something was cheaper that she could get put on. Please advise the patient on what she needs to do.

## 2023-05-02 NOTE — TELEPHONE ENCOUNTER
Samples and coupon card up front for patient. If she cannot make it here to get that per dr schultz we can call in plavix for her.

## 2023-05-04 NOTE — TELEPHONE ENCOUNTER
LMOM for the patient to get a Brilinta copay card. If she can't pick it up or want us to mail it to her then we can send in script for Plavix. OK FOR HUB TO RELEASE.

## 2023-05-10 ENCOUNTER — TELEPHONE (OUTPATIENT)
Dept: CARDIOLOGY | Facility: CLINIC | Age: 78
End: 2023-05-10

## 2023-05-10 NOTE — TELEPHONE ENCOUNTER
Caller: Finn Vyas    Relationship to patient: Self    Best call back number: [phone]    Patient is needing: PATIENT RESCHEDULED HER HOSPITAL FOLLOW UP THAT SHE HAD NO SHOWED 05.01.23. PATIENT WAS SCHEDULED FOR FIRST AVAILABLE 05.23.23 @ 11 AM. WITH JAIME LANG. PLEASE CONTACT PATIENT IF SHE NEEDS A SOONER APPOINTMENT. THANK YOU.

## 2023-05-23 ENCOUNTER — OFFICE VISIT (OUTPATIENT)
Dept: CARDIOLOGY | Facility: CLINIC | Age: 78
End: 2023-05-23
Payer: MEDICARE

## 2023-05-23 VITALS
DIASTOLIC BLOOD PRESSURE: 76 MMHG | OXYGEN SATURATION: 99 % | SYSTOLIC BLOOD PRESSURE: 146 MMHG | HEIGHT: 57 IN | HEART RATE: 65 BPM | BODY MASS INDEX: 26.54 KG/M2 | WEIGHT: 123 LBS

## 2023-05-23 DIAGNOSIS — Z98.61 S/P PTCA (PERCUTANEOUS TRANSLUMINAL CORONARY ANGIOPLASTY): ICD-10-CM

## 2023-05-23 DIAGNOSIS — I25.810 CORONARY ARTERY DISEASE INVOLVING CORONARY BYPASS GRAFT OF NATIVE HEART WITHOUT ANGINA PECTORIS: Primary | ICD-10-CM

## 2023-05-23 DIAGNOSIS — I21.3 ST ELEVATION MYOCARDIAL INFARCTION (STEMI), UNSPECIFIED ARTERY: ICD-10-CM

## 2023-05-23 PROBLEM — E78.5 DYSLIPIDEMIA: Status: ACTIVE | Noted: 2023-05-23

## 2023-05-23 NOTE — PROGRESS NOTES
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 female.  Patient is a 77-year-old female who was admitted to [facility] in March 2023 with chest pain and found to be having an inferior wall ST segment elevation MI.  She was taken urgently to the Cath Lab and underwent PCI of the RCA by Dr. Carrasco.    She went back to the Cath Lab later the same visitHer ejection fraction was normal.  There was subtotal occlusion of the circumflex with competitive flow heavily collateralized in retrograde fashion from the RCA nonobstructive disease in the LAD and patent stent in the RCA.    The patient was back in the emergency room on 1 occasion following an episode of she described as a severe headache and weakness.  The symptoms resolved and have not recurred.    Since her hospital discharge she denies any chest pain, worsening dyspnea, PND, orthopnea, palpitations, near syncope or feelings of her heart racing.    Unfortunately there was a period of where she went to get her Brilinta refilled and she was told it would cost $500.  She contacted our office and was told that she could come  samples that we made available for her.  However she did not do that.  She was without Brilinta for 13 days.  She was then able to get the Brilinta filled at a reduced cost of $41 a month and has been on the Brilinta since then.              ASSESSMENT/PLAN:      Diagnoses and all orders for this visit:    1. Coronary artery disease involving coronary bypass graft of native heart without angina pectoris (Primary)    2. ST elevation myocardial infarction (STEMI), unspecified artery    3. S/P PTCA  (percutaneous transluminal coronary angioplasty)            MEDICAL DECISION MAKING:      Patient's EKG today shows sinus rhythm with inferolateral T wave inversion.  Otherwise no acute ST or T wave segment abnormalities.    We have had a long discussion with the patient and her son regarding the importance of compliance with medical therapy.  She has been advised that even 1 day without Brilinta could put her at risk for in-stent restenosis or MI.    We have discussed that if Brilinta becomes cost prohibitive in the future we could consider transitioning to Plavix but for now she is able to get for $41 a month.  She promises me that she will not stop taking Brilinta again and she will contact our office if she has any problems for filling her medication.    In addition I reviewed her home blood pressure log and she is having periods of systolic blood pressures in the 90s.  We will hold her Cozaar 12.5 mg daily for now.    There was some consideration for staged intervention to the left circumflex.  At this time the patient is pain-free her ejection fraction normalized after PCI to the RCA.  With her period of time that she was without dual antiplatelet therapy for now we will continue to observe I have reviewed her case with Dr. Carrasco.  We will plan on seeing her back for follow-up in the office if she develops any chest pain she has been advised to contact the office sooner.  Again compliance and strict adherence to medical therapy has been discussed with patient and she verbalizes understanding      Past Medical History:   Diagnosis Date   • Myocardial infarction        Past Surgical History:   Procedure Laterality Date   • CARDIAC CATHETERIZATION N/A 3/25/2023    Procedure: Left Heart Cath;  Surgeon: Nico Carrasco MD;  Location: [facility] INVASIVE LOCATION;  Service: Cardiology;  Laterality: N/A;   • CARDIAC CATHETERIZATION N/A 3/30/2023    Procedure: Left Heart Cath;  Surgeon: Nico Carrasco  MD Sabas;  Location: [facility] CATH INVASIVE LOCATION;  Service: Cardiology;  Laterality: N/A;         Current Outpatient Medications:   •  aspirin 81 MG chewable tablet, Chew 1 tablet Daily., Disp: 90 tablet, Rfl: 3  •  atorvastatin (LIPITOR) 80 MG tablet, Take 1 tablet by mouth Every Night., Disp: 90 tablet, Rfl: 3  •  isosorbide mononitrate (IMDUR) 30 MG 24 hr tablet, Take 1 tablet by mouth Daily., Disp: 30 tablet, Rfl: 1  •  metoprolol succinate XL (TOPROL-XL) 50 MG 24 hr tablet, Take 1 tablet by mouth Daily., Disp: 90 tablet, Rfl: 3  •  nitroglycerin (NITROSTAT) 0.4 MG SL tablet, Place 1 tablet under the tongue Every 5 (Five) Minutes As Needed for Chest Pain. Take no more than 3 doses in 15 minutes., Disp: 60 tablet, Rfl: 12  •  ticagrelor (BRILINTA) 90 MG tablet tablet, Take 1 tablet by mouth 2 (Two) Times a Day., Disp: 60 tablet, Rfl: 11    Social History     Socioeconomic History   • Marital status:    Tobacco Use   • Smoking status: Former     Packs/day: 0.25     Types: Cigarettes     Quit date: 3/13/2020     Years since quitting: 3.1   • Smokeless tobacco: Never   • Tobacco comments:     No longer smokes   Vaping Use   • Vaping Use: Never used   Substance and Sexual Activity   • Alcohol use: Yes     Comment: occasional   • Drug use: Never   • Sexual activity: Not Currently       Family History   Problem Relation Age of Onset   • No Known Problems Mother    • No Known Problems Father        The following portions of the patient's history were reviewed and updated as appropriate: allergies, current medications, past family history, past medical history, past social history, past surgical history and problem list.    Review of Systems   Constitutional: Positive for malaise/fatigue.   Neurological: Positive for headaches.   All other systems reviewed and are negative.      Pertinent items are noted in HPI, all other systems reviewed and negative    /76 (BP Location: Right arm, Patient Position:  "Sitting, Cuff Size: Small Adult)   Pulse 65   Ht 144.8 cm (57\")   Wt 55.8 kg (123 lb)   SpO2 99%   BMI 26.62 kg/m² .  Objective     Vitals reviewed.   Constitutional:       General: Not in acute distress.     Appearance: Normal appearance. Well-developed.   Eyes:      Pupils: Pupils are equal, round, and reactive to light.   HENT:      Head: Normocephalic and atraumatic.   Neck:      Vascular: No JVD.   Pulmonary:      Effort: Pulmonary effort is normal.      Breath sounds: Normal breath sounds.   Cardiovascular:      Normal rate. Regular rhythm.   Edema:     Peripheral edema absent.   Abdominal:      General: There is no distension.      Palpations: Abdomen is soft.      Tenderness: There is no abdominal tenderness.   Musculoskeletal: Normal range of motion.      Cervical back: Normal range of motion and neck supple. Skin:     General: Skin is warm and dry.   Neurological:      Mental Status: Alert and oriented to person, place, and time.             ECG 12 Lead    Date/Time: 5/23/2023 1:02 PM  Performed by: Inga Iglesias APRN  Authorized by: Inga Iglesias APRN   Comparison: not compared with previous ECG   Previous ECG: no previous ECG available  Rhythm: sinus rhythm  Rate: normal  BPM: 65  Conduction: conduction normal  T inversion: III, aVF, V5 and V6  T flattening: II  QRS axis: normal  Other findings: low voltage    Clinical impression: abnormal EKG            EKG ordered by and reviewed by me in office                  "

## 2023-08-22 ENCOUNTER — TELEPHONE (OUTPATIENT)
Dept: CARDIOLOGY | Facility: CLINIC | Age: 78
End: 2023-08-22

## 2023-08-22 NOTE — TELEPHONE ENCOUNTER
Caller: Finn Vyas    Relationship to patient: Self    Best call back number:[phone]    Patient is needing: PATIENT IS NOT FEELING WELL, CHECKING TO SEE IF SHE CAN TAKE ROBITUSSIN FOR CONGESTION .

## 2023-10-30 ENCOUNTER — TRANSCRIBE ORDERS (OUTPATIENT)
Dept: ADMINISTRATIVE | Facility: HOSPITAL | Age: 78
End: 2023-10-30
Payer: MEDICARE

## 2023-10-30 DIAGNOSIS — F17.210 CIGARETTE NICOTINE DEPENDENCE, UNCOMPLICATED: ICD-10-CM

## 2023-10-30 DIAGNOSIS — Z78.0 POST-MENOPAUSAL: ICD-10-CM

## 2023-10-30 DIAGNOSIS — Z12.31 SCREENING MAMMOGRAM, ENCOUNTER FOR: Primary | ICD-10-CM

## (undated) DEVICE — GW RUNTHROUGH NS HYPERCOAT .014 3X180CM

## (undated) DEVICE — CATH DIAG IMPULSE PIG .056 6F 110CM

## (undated) DEVICE — Device

## (undated) DEVICE — PROVE COVER: Brand: UNBRANDED

## (undated) DEVICE — ANGIO-SEAL VIP VASCULAR CLOSURE DEVICE: Brand: ANGIO-SEAL

## (undated) DEVICE — 6F .070 3 DRC 100CM: Brand: VISTA BRITE TIP

## (undated) DEVICE — STPCK 3WY HP ROT

## (undated) DEVICE — HI-TORQUE BALANCE MIDDLEWEIGHT UNIVERSAL II GUIDE WIRE J TIP PAK 190 CM: Brand: HI-TORQUE BALANCE MIDDLEWEIGHT UNIVERSAL II

## (undated) DEVICE — GUIDE CATHETER: Brand: MACH1™

## (undated) DEVICE — ELECTRD DEFIB M/FUNC PROPADZ RADIOL 2PK

## (undated) DEVICE — MINI TREK CORONARY DILATATION CATHETER 1.20 MM X 12 MM / RAPID-EXCHANGE: Brand: MINI TREK

## (undated) DEVICE — CATH DIAG IMPULSE FR4 6F 100CM

## (undated) DEVICE — SYR LL TP 10ML STRL

## (undated) DEVICE — NC TREK CORONARY DILATATION CATHETER 3.0 MM X 12 MM / RAPID-EXCHANGE: Brand: NC TREK

## (undated) DEVICE — CONTRST ISOVUE300 61PCT 50ML

## (undated) DEVICE — CATH DIAG IMPULSE FL4 6F 100CM

## (undated) DEVICE — GW PTFE EMERALD HEPCOAT FC J TIP STD .035 3MM 150CM

## (undated) DEVICE — TBG NAMIC PRESS MONTR A/ F/M 12IN

## (undated) DEVICE — DEV INFL COMPAK W/ACCESSPLUS IN4530

## (undated) DEVICE — GUIDELINER CATHETERS ARE INTENDED TO BE USED IN CONJUNCTION WITH GUIDE CATHETERS TO ACCESS DISCRETE REGIONS OF THE CORONARY AND/OR PERIPHERAL VASCULATURE, AND TO FACILITATE PLACEMENT OF INTERVENTIONAL DEVICES.: Brand: GUIDELINER® V3 CATHETER

## (undated) DEVICE — BALN NC/EUPHORA RX 2.00X20MM

## (undated) DEVICE — BOWL PLSTC MD 16OZ BLU STRL

## (undated) DEVICE — PINNACLE INTRODUCER SHEATH: Brand: PINNACLE

## (undated) DEVICE — CATH GUIDE ZUMA2 EBU 6F 3.5X100CM

## (undated) DEVICE — PK TRY HEART CATH 50

## (undated) DEVICE — HI-TORQUE WHISPER ES GUIDE WIRE .014 STRAIGHTTIP 3.0 CM X 190 CM: Brand: HI-TORQUE WHISPER

## (undated) DEVICE — ST ACC MICROPUNCTURE STFF/CANN PLAT/TP 4F 21G 40CM